# Patient Record
Sex: MALE | Race: BLACK OR AFRICAN AMERICAN | NOT HISPANIC OR LATINO | Employment: UNEMPLOYED | URBAN - METROPOLITAN AREA
[De-identification: names, ages, dates, MRNs, and addresses within clinical notes are randomized per-mention and may not be internally consistent; named-entity substitution may affect disease eponyms.]

---

## 2021-02-18 ENCOUNTER — HOSPITAL ENCOUNTER (EMERGENCY)
Facility: HOSPITAL | Age: 36
Discharge: HOME | End: 2021-02-19
Attending: EMERGENCY MEDICINE
Payer: COMMERCIAL

## 2021-02-18 VITALS
HEART RATE: 97 BPM | OXYGEN SATURATION: 99 % | DIASTOLIC BLOOD PRESSURE: 69 MMHG | TEMPERATURE: 98 F | SYSTOLIC BLOOD PRESSURE: 134 MMHG | RESPIRATION RATE: 16 BRPM

## 2021-02-18 DIAGNOSIS — F41.9 ANXIETY: Primary | ICD-10-CM

## 2021-02-18 DIAGNOSIS — Z76.89 ENCOUNTER FOR PSYCHIATRIC ASSESSMENT: ICD-10-CM

## 2021-02-18 PROCEDURE — 99283 EMERGENCY DEPT VISIT LOW MDM: CPT

## 2021-02-18 PROCEDURE — 99284 EMERGENCY DEPT VISIT MOD MDM: CPT | Performed by: PHYSICIAN ASSISTANT

## 2021-02-18 RX ORDER — HYDROXYZINE HYDROCHLORIDE 25 MG/1
25 TABLET, FILM COATED ORAL EVERY 6 HOURS
Qty: 12 TABLET | Refills: 0 | Status: SHIPPED | OUTPATIENT
Start: 2021-02-18

## 2021-02-18 RX ORDER — OLANZAPINE 5 MG/1
10 TABLET, ORALLY DISINTEGRATING ORAL ONCE
Status: COMPLETED | OUTPATIENT
Start: 2021-02-18 | End: 2021-02-18

## 2021-02-18 RX ADMIN — OLANZAPINE 10 MG: 5 TABLET, ORALLY DISINTEGRATING ORAL at 22:19
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
## 2021-02-19 NOTE — DISCHARGE INSTRUCTIONS
Please refer to the attached information for strict return instructions  If symptoms worsen or new symptoms develop please return to the ER  Please schedule follow up with psychiatry as soon as possible for re-evaluation  Use prescribed medication as needed for anxiety

## 2021-02-20 ENCOUNTER — HOSPITAL ENCOUNTER (EMERGENCY)
Facility: HOSPITAL | Age: 36
Discharge: HOME | End: 2021-02-21
Attending: EMERGENCY MEDICINE | Admitting: EMERGENCY MEDICINE
Payer: COMMERCIAL

## 2021-02-20 DIAGNOSIS — F19.10 DRUG ABUSE (HCC): Primary | ICD-10-CM

## 2021-02-20 LAB
AMPHETAMINES SERPL QL SCN: POSITIVE
BARBITURATES UR QL: NEGATIVE
BENZODIAZ UR QL: NEGATIVE
COCAINE UR QL: NEGATIVE
ETHANOL EXG-MCNC: 0 MG/DL
FLUAV RNA RESP QL NAA+PROBE: NEGATIVE
FLUBV RNA RESP QL NAA+PROBE: NEGATIVE
METHADONE UR QL: NEGATIVE
OPIATES UR QL SCN: NEGATIVE
OXYCODONE+OXYMORPHONE UR QL SCN: NEGATIVE
PCP UR QL: NEGATIVE
RSV RNA RESP QL NAA+PROBE: NEGATIVE
SARS-COV-2 RNA RESP QL NAA+PROBE: NEGATIVE
THC UR QL: NEGATIVE

## 2021-02-20 PROCEDURE — 99284 EMERGENCY DEPT VISIT MOD MDM: CPT | Performed by: EMERGENCY MEDICINE

## 2021-02-20 PROCEDURE — 80307 DRUG TEST PRSMV CHEM ANLYZR: CPT | Performed by: EMERGENCY MEDICINE

## 2021-02-20 PROCEDURE — 0241U HB NFCT DS VIR RESP RNA 4 TRGT: CPT | Performed by: EMERGENCY MEDICINE

## 2021-02-20 PROCEDURE — 99284 EMERGENCY DEPT VISIT MOD MDM: CPT

## 2021-02-20 PROCEDURE — 82075 ASSAY OF BREATH ETHANOL: CPT | Performed by: EMERGENCY MEDICINE

## 2021-02-20 RX ORDER — MIRTAZAPINE 30 MG/1
30 TABLET, FILM COATED ORAL
COMMUNITY

## 2021-02-20 RX ORDER — OLANZAPINE 5 MG/1
10 TABLET, ORALLY DISINTEGRATING ORAL ONCE
Status: COMPLETED | OUTPATIENT
Start: 2021-02-20 | End: 2021-02-20

## 2021-02-20 RX ADMIN — OLANZAPINE 10 MG: 5 TABLET, ORALLY DISINTEGRATING ORAL at 19:16

## 2021-02-21 VITALS
OXYGEN SATURATION: 99 % | DIASTOLIC BLOOD PRESSURE: 80 MMHG | TEMPERATURE: 97.5 F | SYSTOLIC BLOOD PRESSURE: 125 MMHG | WEIGHT: 143.74 LBS | RESPIRATION RATE: 16 BRPM | HEART RATE: 68 BPM

## 2021-02-21 NOTE — ED NOTES
Chief Complaint   Patient presents with    Anxiety     Pt states that he has been having increased anxiety and feeling that "somebody follwed me into the hospital "     Pt  Is a 39 y/o Male who presented to ED with Anxiety and Paranoia  Pt  Denies SI/HI at this time and claims to only have came to ED because he felt safe here  Pt  Thinks that he may be hearing voices and seeing things and people who are not really there  Pt  Is claims to currently be homeless  Pt  Was UDS came up Positive for Amph/Meth to which pt  Claimed that he used Meth lasted week and is currently using K2  when asked if pt  Wanted to get detox or rehab he said yes  Crisis intake Completed  Safety Risk assessment completed       Host currently beinging notified

## 2021-02-21 NOTE — ED NOTES
Belongings removed from room and placed in locker  Pt changed into paper scrubs       Reyes Castaneda RN  02/20/21 0074

## 2021-02-21 NOTE — ED NOTES
The rehab facility that pt is accepted to is 3 hrs away and is too far for a lyft transport to be approved  Leticia Montoya from [facility] has confirmation of facility address and states that transportation is available and a  from the facility will be picking him up this evening   ED address and phone number relayed so transportation can call when they arrive     Lorenza Sicard  02/21/21 [address]  02/21/21 5234

## 2021-02-21 NOTE — ED NOTES
Patient spoke with Rao Bustamante from host he states she will be bed searching him and will call us back        Felicia Ball RN  02/21/21 5511

## 2021-02-21 NOTE — ED NOTES
RN notified CW that Host attempted to talk to PT but Pt was falling alseep so they will call back in a hour

## 2021-02-21 NOTE — ED NOTES
Patient belongings placed in reserve locker  Patient given a turkey sandwich and orange juice at this time       Bambi Sexton  02/20/21 1946

## 2021-02-21 NOTE — ED NOTES
Rima FERRERA now states they cannot find transportation for patient and we would be required to find transport       Emiliano Rodarte RN  02/21/21 2200

## 2021-02-21 NOTE — ED NOTES
Spoke with Gina Ramsey from [facility], who states she will call back in about an hour to complete assessment  All required information faxed at this time
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
## 2021-02-21 NOTE — ED NOTES
Rachel Hernandez with host called back, states she got the patient a bed at [facility] however patient needs to call and do the pre-screening questions  I called for patient and gave him the phone to do the questions at this time  Rachel Hernandez will then reach out to [facility] to complete the process        Dolly Hodgkins, RN  02/21/21 1621

## 2021-02-21 NOTE — ED NOTES
Patient in bed sleeping, respirations equal and non-labored   Spoke with Zarina (crisis worker) and made her aware of HOST eval       Karin Marcum RN  02/21/21 8631

## 2021-02-21 NOTE — CASE MANAGEMENT
Received notification from Chante, hospital supervisor, that patient is requiring transportation to drug rehab at [facility]  Initially [employer] [facility] was going to transport, but last minute, they stated they couldn't find transportation  Called [facility] to arrange Lyft ride  [place] is approximately 3 hours from hospital   accepted the ride and estimated cost is between $173-$867  Discussed with  Clinical Coordinator, Erika Austin  Agreed that hospital will pay for transportation to avoid unnecessary delayed discharge  Updated [place] Ramon coordinator, that I have approval for transportation cost  Marianne Hedrick confirmed  will be at hospital in 15 minutes  Notified Chante of transport time and she stated she would call ER to make sure they are aware and have patient ready to leave  No other d/c needs identified

## 2021-02-21 NOTE — ED NOTES
Host spoke with pt and once again stated that pt would not stay awake for eval  Host worker stating they will try again in the morning  Provider made aware        Larry Mari RN  02/20/21 5590

## 2021-02-21 NOTE — ED CARE HANDOFF
Emergency Department Sign Out Note        Sign out and transfer of care from Dr Kevin Obrien  See Separate Emergency Department note  The patient, Harriet Gomes, was evaluated by the previous provider for drug usage, patient requesting detox       Workup Completed:  Labs Reviewed   RAPID DRUG SCREEN, URINE - Abnormal       Result Value Ref Range Status    Amph/Meth UR Positive (*) Negative Final    Barbiturate Ur Negative  Negative Final    Benzodiazepine Urine Negative  Negative Final    Cocaine Urine Negative  Negative Final    Methadone Urine Negative  Negative Final    Opiate Urine Negative  Negative Final    PCP Ur Negative  Negative Final    THC Urine Negative  Negative Final    Oxycodone Urine Negative  Negative Final    Narrative:     FOR MEDICAL PURPOSES ONLY  IF CONFIRMATION NEEDED PLEASE CONTACT THE LAB WITHIN 5 DAYS  Drug Screen Cutoff Levels:  AMPHETAMINE/METHAMPHETAMINES  1000 ng/mL  BARBITURATES     200 ng/mL  BENZODIAZEPINES     200 ng/mL  COCAINE      300 ng/mL  METHADONE      300 ng/mL  OPIATES      300 ng/mL  PHENCYCLIDINE     25 ng/mL  THC       50 ng/mL  OXYCODONE      100 ng/mL   COVID19, INFLUENZA A/B, RSV PCR, [facility] - Normal    SARS-CoV-2 Negative  Negative Final    Comment:      INFLUENZA A PCR Negative  Negative Final    Comment:      INFLUENZA B PCR Negative  Negative Final    Comment:      RSV PCR Negative  Negative Final    Comment:      Narrative: This test has been authorized by FDA under an EUA (Emergency Use Assay) for use by authorized laboratories  Clinical caution and judgement should be used with the interpretation of these results with consideration of the clinical impression and other laboratory testing  Testing reported as "Positive" or "Negative" has been proven to be accurate according to standard laboratory validation requirements  All testing is performed with control materials showing appropriate reactivity at standard intervals     POCT ALCOHOL BREATH TEST - Normal    EXTBreath Alcohol 0 00   Final         ED Course / Workup Pending (followup): Pt was evaluated by HOST, patient was reportedly falling asleep during the encounter, HOST indicated they would call back in the morning of February 21st for further assessment      1000: Informed patient spoke with HOST, has been accepted at [facility], transfer logistics currently being worked on     97 [phone] [id number]:  Transportation arranged by HOST arrived, patient discharged to go directly to [facility]                        ED Course as of Feb 21 1557   Michelle Tinajeroley Feb 21, 2021   1554 Informed by nursing staff that after the patient left the department, his transportation ride had left the hospital without the patient  Rima FERRERA was contacted, they indicated that they wanted [facility] to arrange transportation for the patient to go to [facility]  Pt signed out to Dr Jessica Galeana who will addend this dictation only if the patient can't be transported to rehab facility        Procedures  MDM    Disposition  Final diagnoses:   Drug abuse [facility])     Time reflects when diagnosis was documented in both MDM as applicable and the Disposition within this note     Time User Action Codes Description Comment    2/21/2021 10:03 AM Adrienne Mclain Add [F19 10] Drug abuse [facility])       ED Disposition     None      Follow-up Information     Follow up With Specialties Details Why Contact Info    Go directly to [facility]            Patient's Medications   Discharge Prescriptions    No medications on file     No discharge procedures on file         ED Provider  Electronically Signed by     Julio Winter, DO  02/21/21 [id number], DO  02/21/21 1559

## 2021-02-21 NOTE — ED NOTES
Pt spoke with host via telephone, host worker stated that pt is falling asleep, not answering questions  Will call back in an hour to re-evaluate  Upon entering room pt is arousable to verbal stimuli        Rosa Elena Castillo RN  02/20/21 2053

## 2021-02-21 NOTE — ED NOTES
Joslyn arriving for patient in approx 5-10 mins per hospital supervisor       Isabela Aldana RN  02/21/21 2518

## 2021-02-21 NOTE — ED NOTES
Patient completed pre-screening questions with [facility], awaiting call back from HOST at this time for transport time  Dr Rosanna Nix aware       Massimo Arroyo RN  02/21/21 8410

## 2021-04-26 ENCOUNTER — TELEPHONE (OUTPATIENT)
Dept: PSYCHIATRY | Facility: CLINIC | Age: 36
End: 2021-04-26

## 2023-10-20 ENCOUNTER — HOSPITAL ENCOUNTER (EMERGENCY)
Facility: HOSPITAL | Age: 38
Discharge: HOME | End: 2023-10-20
Attending: EMERGENCY MEDICINE
Payer: MEDICARE

## 2023-10-20 VITALS
WEIGHT: 147.2 LBS | RESPIRATION RATE: 20 BRPM | HEART RATE: 61 BPM | DIASTOLIC BLOOD PRESSURE: 62 MMHG | TEMPERATURE: 98.2 F | OXYGEN SATURATION: 100 % | SYSTOLIC BLOOD PRESSURE: 111 MMHG

## 2023-10-20 DIAGNOSIS — F31.9 BIPOLAR DISORDER (HCC): ICD-10-CM

## 2023-10-20 DIAGNOSIS — F14.10 COCAINE ABUSE (HCC): Primary | ICD-10-CM

## 2023-10-20 LAB
AMPHETAMINES SERPL QL SCN: NEGATIVE
BARBITURATES UR QL: NEGATIVE
BENZODIAZ UR QL: NEGATIVE
COCAINE UR QL: POSITIVE
ETHANOL EXG-MCNC: 0 MG/DL
METHADONE UR QL: NEGATIVE
OPIATES UR QL SCN: NEGATIVE
OXYCODONE+OXYMORPHONE UR QL SCN: NEGATIVE
PCP UR QL: NEGATIVE
THC UR QL: NEGATIVE

## 2023-10-20 PROCEDURE — 82075 ASSAY OF BREATH ETHANOL: CPT

## 2023-10-20 PROCEDURE — 80307 DRUG TEST PRSMV CHEM ANLYZR: CPT

## 2023-10-20 PROCEDURE — 99282 EMERGENCY DEPT VISIT SF MDM: CPT

## 2023-10-20 PROCEDURE — 99285 EMERGENCY DEPT VISIT HI MDM: CPT

## 2023-10-20 RX ORDER — OLANZAPINE 10 MG/1
10 TABLET ORAL
Qty: 30 TABLET | Refills: 0 | Status: SHIPPED | OUTPATIENT
Start: 2023-10-20

## 2023-10-20 RX ORDER — FLUOXETINE HYDROCHLORIDE 20 MG/1
40 CAPSULE ORAL DAILY
Status: DISCONTINUED | OUTPATIENT
Start: 2023-10-20 | End: 2023-10-20 | Stop reason: HOSPADM

## 2023-10-20 RX ORDER — OLANZAPINE 10 MG/1
10 TABLET ORAL
Qty: 30 TABLET | Refills: 0 | Status: SHIPPED | OUTPATIENT
Start: 2023-10-20 | End: 2023-10-20 | Stop reason: SDUPTHER

## 2023-10-20 RX ORDER — FLUOXETINE HYDROCHLORIDE 40 MG/1
40 CAPSULE ORAL DAILY
Qty: 30 CAPSULE | Refills: 0 | Status: SHIPPED | OUTPATIENT
Start: 2023-10-20 | End: 2023-10-20 | Stop reason: SDUPTHER

## 2023-10-20 RX ORDER — FLUOXETINE HYDROCHLORIDE 40 MG/1
40 CAPSULE ORAL DAILY
Qty: 30 CAPSULE | Refills: 0 | Status: SHIPPED | OUTPATIENT
Start: 2023-10-20

## 2023-10-20 RX ORDER — OLANZAPINE 10 MG/1
10 TABLET ORAL ONCE
Status: COMPLETED | OUTPATIENT
Start: 2023-10-20 | End: 2023-10-20

## 2023-10-20 RX ADMIN — FLUOXETINE 40 MG: 20 CAPSULE ORAL at 07:50

## 2023-10-20 RX ADMIN — OLANZAPINE 10 MG: 10 TABLET, FILM COATED ORAL at 07:50

## 2023-10-20 NOTE — ED CARE HANDOFF
[facility] Warm Handoff Outcome Note    Patient name Umer Rivera  Location ED 07/ED 07 MRN [id number]  Age: [address] y.o. Plan Type:   Warm Handoff                                                                                    Plan Date: 10/20/2023  Service:  ED Warm Handoff      Substance Use History:  Cocaine    Warm Handoff Update:  Pt accepted IP bed at [facility]    Warm Handoff Outcome: Residential  Inpatient

## 2023-10-20 NOTE — CERTIFIED RECOVERY SPECIALIST
Certified  Note    Patient name: Anny Friday  Location: ED 07/ED [address]: [address] [place] [address]  Attending:  Arnie Tobar MRN [id number]  : 1985  Age: 43 y.o. Sex: male Date 10/20/2023         Substance Use History:     Social History     Substance and Sexual Activity   Alcohol Use Not Currently        Social History     Substance and Sexual Activity   Drug Use Yes    Types: Other, "Crack" cocaine    Comment: synthetic marijuana       Admission Information  Substances Used at This Admission[de-identified] Cocaine  Readmission in Last 30 Days?: No  Encounter Type[de-identified] Patient Face-to-Face    Recovery Support Plan  Declined All Services?: No  Medication Assisted Treatment[de-identified] No  Agreeable to Warm Handoff?: Yes  Is Patient Accepting [facility] Services?: Yes  Was Referral Made to [address] [place] [address]?: No  Was Narcan Provided at Discharge?: No  Plan Discussed With Treatment Team[de-identified] Yes  Plan Discussed With[de-identified] Provider, Nurse    Referral to Recovery Supports:  [facility] [address][de-identified] No  Community Based CRS[de-identified] No  Case Management[de-identified] No  Direct Access to [facility]?: No  Resource Guide Given?: Yes  Follow Up With Patient[de-identified] Yes (Ongoing)  Family / Other Support[de-identified] No  Referral for Community Physical Health[de-identified] No  Referral for Community Mental Health[de-identified] No'    CRS received consult to meet with patient. CRS provided introductions and explanation of service. Patient falling asleep during conversation but did share intention of return visit. Patient acknowledged and agreed. CRS updated nurse and provider.   CRS shared concern for possible fentanyl ingestion/overdose       Nasim Bowie

## 2023-10-20 NOTE — ED CARE HANDOFF
Emergency Department Sign Out Note        Sign out and transfer of care from Dr. Aurelia Miller. See Separate Emergency Department note. The patient, Emelia Garza, was evaluated by the previous provider for detox. Workup Completed:  Medical care for detox admission    ED Course / Workup Pending (followup):  Prescribed patient's psychiatric medications, placed in rehab/detox. Procedures  Medical Decision Making  Amount and/or Complexity of Data Reviewed  Labs: ordered. Risk  Prescription drug management. Disposition  Final diagnoses:   Cocaine abuse ([address])   Bipolar disorder ([address])     Time reflects when diagnosis was documented in both MDM as applicable and the Disposition within this note       Time User Action Codes Description Comment    10/20/2023  6:14 AM Nora Iqbal [F14.10] Cocaine abuse ([address])     10/20/2023  6:32 AM Nora Iqbal [F31.9] Bipolar disorder [facility])           ED Disposition       ED Disposition   Discharge    Condition   Stable    Date/Time   Fri Oct 20, 2023 11:06 AM    Comment   Emelia Garza discharge to home/self care. Follow-up Information    None       Current Discharge Medication List        START taking these medications    Details   FLUoxetine (PROzac) 40 MG capsule Take 1 capsule (40 mg total) by mouth daily  Qty: 30 capsule, Refills: 0    Associated Diagnoses: Cocaine abuse (HCC)      OLANZapine (ZyPREXA) 10 mg tablet Take 1 tablet (10 mg total) by mouth daily at bedtime  Qty: 30 tablet, Refills: 0    Associated Diagnoses: Cocaine abuse ([address])           CONTINUE these medications which have NOT CHANGED    Details   hydrOXYzine HCL (ATARAX) 25 mg tablet Take 1 tablet (25 mg total) by mouth every 6 (six) hours  Qty: 12 tablet, Refills: 0    Associated Diagnoses: Anxiety      mirtazapine (REMERON) 30 mg tablet Take 30 mg by mouth daily at bedtime           No discharge procedures on file.        ED Provider  Electronically Signed by     Larissa Licona MD  10/20/23 0574

## 2023-10-20 NOTE — ED NOTES
Pt agreeable to go to [facility]. Host aware of same.      Eric Conway RN  10/20/23 0436

## 2023-10-20 NOTE — ED PROVIDER NOTES
History  Chief Complaint   Patient presents with    Detox Evaluation     Requesting detox from crack-cocaine - using for about five months; last use a few hours ago - uses about 3.5 grams a day - denies alcohol or other drug use      The patient is a 43 y.o. male with a past medical history of bipolar disorder and polysubstance abuse, who presents for a detox evaluation. He reports smoking 3.5g of crack cocaine daily for the last five months. Denies associated methamphetamine, THC, opioid, or alcohol use. The patient has been in detox/rehab for polysubstance abuse several times before, the most recent time being a few months ago. He also reports that he is seeing shadows, but is unsure if this is because he has not slept in 3 days or because he has not taken his psychiatric medications (Fluoxetine and Lamotrigine) in the last 2-3 months. Denies SI, HI, or auditory hallucinations. Patient does state he would like to be restarted on his psychiatric medications. No physical complaints at this time. Prior to Admission Medications   Prescriptions Last Dose Informant Patient Reported? Taking?   hydrOXYzine HCL (ATARAX) 25 mg tablet   No No   Sig: Take 1 tablet (25 mg total) by mouth every 6 (six) hours   Patient not taking: Reported on 2/20/2021   mirtazapine (REMERON) 30 mg tablet   Yes No   Sig: Take 30 mg by mouth daily at bedtime      Facility-Administered Medications: None       Past Medical History:   Diagnosis Date    Anxiety        History reviewed. No pertinent surgical history. History reviewed. No pertinent family history. I have reviewed and agree with the history as documented. E-Cigarette/Vaping     E-Cigarette/Vaping Substances     Social History     Tobacco Use    Smoking status: Every Day    Smokeless tobacco: Never   Substance Use Topics    Alcohol use: Not Currently    Drug use: Yes     Types:  Other, "Crack" cocaine     Comment: synthetic marijuana       Review of Systems Constitutional:  Negative for chills, diaphoresis and fever. HENT:  Negative for ear pain and sore throat. Eyes:  Negative for pain and visual disturbance. Respiratory:  Negative for cough and shortness of breath. Cardiovascular:  Negative for chest pain and palpitations. Gastrointestinal:  Negative for abdominal pain, nausea and vomiting. Genitourinary:  Negative for dysuria and hematuria. Musculoskeletal:  Negative for arthralgias, back pain and myalgias. Skin:  Negative for color change and rash. Neurological:  Negative for seizures, syncope and headaches. Psychiatric/Behavioral:  Positive for hallucinations and sleep disturbance. Negative for suicidal ideas. All other systems reviewed and are negative. Physical Exam  Physical Exam  Vitals and nursing note reviewed. Constitutional:       General: He is awake. He is not in acute distress. Appearance: Normal appearance. He is well-developed and normal weight. He is not toxic-appearing. HENT:      Head: Normocephalic and atraumatic. Right Ear: External ear normal.      Left Ear: External ear normal.      Nose: Nose normal.      Mouth/Throat:      Lips: Pink. Mouth: Mucous membranes are moist.   Eyes:      General: Lids are normal. Vision grossly intact. Gaze aligned appropriately. Conjunctiva/sclera: Conjunctivae normal.      Pupils: Pupils are equal, round, and reactive to light. Cardiovascular:      Rate and Rhythm: Normal rate and regular rhythm. Pulmonary:      Effort: Pulmonary effort is normal. No respiratory distress. Musculoskeletal:      Cervical back: Normal, full passive range of motion without pain and neck supple. Thoracic back: Normal.      Lumbar back: Normal.      Comments: Patient moves all extremities without difficulty and ambulates with a steady gait. Skin:     General: Skin is warm. Capillary Refill: Capillary refill takes less than 2 seconds.       Coloration: Skin is not jaundiced or pale. Findings: No abrasion, signs of injury, lesion, rash or wound. Neurological:      Mental Status: He is oriented to person, place, and time and easily aroused. GCS: GCS eye subscore is 4. GCS verbal subscore is 5. GCS motor subscore is 6. Comments: Patient is drowsy, but is easily arousable and answers questions appropriately. Psychiatric:         Attention and Perception: Attention normal. He perceives visual hallucinations. Mood and Affect: Mood normal. Affect is flat. Speech: Speech normal. Speech is not rapid and pressured, slurred or tangential.         Behavior: Behavior is cooperative. Thought Content: Thought content normal. Thought content is not paranoid. Thought content does not include homicidal or suicidal ideation. Vital Signs  ED Triage Vitals [10/20/23 0558]   Temperature Pulse Respirations Blood Pressure SpO2   98.2 °F (36.8 °C) 78 20 137/95 100 %      Temp Source Heart Rate Source Patient Position - Orthostatic VS BP Location FiO2 (%)   Oral Monitor Lying Left arm --      Pain Score       --           Vitals:    10/20/23 0558   BP: 137/95   Pulse: 78   Patient Position - Orthostatic VS: Lying       ED Medications  Medications   OLANZapine (ZyPREXA) tablet 10 mg (has no administration in time range)   FLUoxetine (PROzac) capsule 40 mg (has no administration in time range)       Diagnostic Studies  Results Reviewed       Procedure Component Value Units Date/Time    POCT alcohol breath test [[id number]]  (Normal) Resulted: 10/20/23 0629    Lab Status: Final result Updated: 10/20/23 0629     EXTBreath Alcohol 0.00    Rapid drug screen, urine [[id number]] Collected: 10/20/23 0621    Lab Status:  In process Specimen: Urine, Clean Catch Updated: 10/20/23 9462                   No orders to display              Procedures  Procedures         ED Course           SBIRT (Z71.41, Z71.51):  Screening: I have reviewed and agree with the nursing documentation below. Brief Intervention: gave feedback about screening results, impairment, and risks while clarifying the findings, informed the patient about safe consumption limits and offered advice about change, assessed the patient's readiness to change, and negotiated goals and strategies for change  Referral to Treatment: In Process-- Referrals placed for HOST and the certified   Time spent with patient for SBIRT: 15 minutes         SBIRT 22yo+      Flowsheet Row Most Recent Value   Initial Alcohol Screen: US AUDIT-C     1. How often do you have a drink containing alcohol? 0 Filed at: 10/20/2023 0558   2. How many drinks containing alcohol do you have on a typical day you are drinking? 0 Filed at: 10/20/2023 0558   3a. Male UNDER 65: How often do you have five or more drinks on one occasion? 0 Filed at: 10/20/2023 0558   Audit-C Score 0 Filed at: 10/20/2023 3374   SHELLI: How many times in the past year have you. .. Used an illegal drug or used a prescription medication for non-medical reasons? Daily or Almost Daily Filed at: 10/20/2023 4252                Medical Decision Making  Patient with a history of polysubstance use and bipolar disorder presents requesting detox from crack cocaine. He also reports visual hallucinations and not taking his psychiatric medications for several months. He is interested in restarting his psychiatric medications, but is mainly interested in detox. POCT breath alcohol was negative. UDS pending. Reached out to Gianna who also recommended the patient be evaluated by crisis. Patient signed out to Dr. Betsy Joseph for final disposition pending HOST and crisis evaluations. Problems Addressed:  Bipolar disorder [facility]): acute illness or injury  Cocaine abuse [facility]): acute illness or injury    Amount and/or Complexity of Data Reviewed  External Data Reviewed: notes.      Details: Reviewed multiple encounters for [facility] regarding substance use and mental health treatment  Labs: ordered. Disposition  Final diagnoses:   Cocaine abuse ([address])   Bipolar disorder ([address])     Time reflects when diagnosis was documented in both MDM as applicable and the Disposition within this note       Time User Action Codes Description Comment    10/20/2023  6:14 AM Nora Iqbal Add [F14.10] Cocaine abuse ([address])     10/20/2023  6:32 AM Nora Iqbal Add [F31.9] Bipolar disorder [facility])           ED Disposition       None          Follow-up Information    None         Patient's Medications   Discharge Prescriptions    No medications on file       No discharge procedures on file.     PDMP Review       None            ED Provider  Electronically Signed by             Jorge Mock PA-C  10/20/23 0558

## 2023-10-20 NOTE — ED NOTES
Patient is resting comfortably.  No resp distress noted, blanket given to pt for comfort     Juan Alberto Garcia RN  10/20/23 [address], RN  10/20/23 9214

## 2023-10-20 NOTE — CERTIFIED RECOVERY SPECIALIST
Certified  Note    Patient name: Kianna Jane  Location: ED 07/ED [address]: [address] [place] [address]  Attending:  Mary Prince, * MRN [id number]  : 1985  Age: 43 y.o. Sex: male Date 10/20/2023         Substance Use History:     Social History     Substance and Sexual Activity   Alcohol Use Not Currently        Social History     Substance and Sexual Activity   Drug Use Yes    Types: Other, "Crack" cocaine    Comment: synthetic marijuana       Admission Information  Substances Used at This Admission[de-identified] Cocaine  Readmission in Last 30 Days?: No  Encounter Type[de-identified] Patient Face-to-Face    Recovery Support Plan  Declined All Services?: No  Medication Assisted Treatment[de-identified] No  Agreeable to Warm Handoff?: Yes  Is Patient Accepting [facility] Services?: Yes  Was Referral Made to [address] [place] [address]?: No  Was Narcan Provided at Discharge?: No  Plan Discussed With Treatment Team[de-identified] Yes  Plan Discussed With[de-identified] Provider, Nurse    Referral to Recovery Supports:  [facility] [address][de-identified] No  Community Based CRS[de-identified] No  Case Management[de-identified] No  Direct Access to [facility]?: No  Resource Guide Given?: Yes  Follow Up With Patient[de-identified] Yes (Ongoing)  Family / Other Support[de-identified] No  Referral for Community Physical Health[de-identified] No  Referral for Community Mental Health[de-identified] No'    Conversation with provider regarding restart of psychiatric medications.   Email sent to HOST for bed placement      Radha Godfrey

## 2023-10-20 NOTE — CERTIFIED RECOVERY SPECIALIST
Certified  Note    Patient name: Emelia Garza  Location: ED 07/ED [address]: [address] [place] [address]  Attending:  Papa Ochoa, * MRN [id number]  : 1985  Age: 43 y.o. Sex: male Date 10/20/2023         Substance Use History:     Social History     Substance and Sexual Activity   Alcohol Use Not Currently        Social History     Substance and Sexual Activity   Drug Use Yes    Types: Other, "Crack" cocaine    Comment: synthetic marijuana       Admission Information  Substances Used at This Admission[de-identified] Cocaine  Readmission in Last 30 Days?: No  Encounter Type[de-identified] Patient Face-to-Face    Recovery Support Plan  Declined All Services?: No  Medication Assisted Treatment[de-identified] No  Agreeable to Warm Handoff?: Yes  Is Patient Accepting [facility] Services?: Yes  Was Referral Made to [address] [place] [address]?: No  Was Narcan Provided at Discharge?: No  Plan Discussed With Treatment Team[de-identified] Yes  Plan Discussed With[de-identified] Provider, Nurse    Referral to Recovery Supports:  [facility] [address][de-identified] No  Community Based CRS[de-identified] No  Case Management[de-identified] No  Direct Access to [facility]?: No  Resource Guide Given?: Yes  Follow Up With Patient[de-identified] Yes (Ongoing)  Family / Other Support[de-identified] No  Referral for Community Physical Health[de-identified] No  Referral for Community Mental Health[de-identified] No'    HOST contact:    [facility] search initiated. [facility]. Will update. Nurse, provider, and charge made aware. Charge attached to email with HOST.     Update:  Patient accepted at , will call ED to confirm and discuss       [address] [facility]

## 2024-01-29 ENCOUNTER — HOSPITAL ENCOUNTER (EMERGENCY)
Facility: HOSPITAL | Age: 39
Discharge: HOME | End: 2024-01-29
Attending: EMERGENCY MEDICINE
Payer: MEDICARE

## 2024-01-29 VITALS
TEMPERATURE: 99.2 F | RESPIRATION RATE: 20 BRPM | OXYGEN SATURATION: 98 % | HEART RATE: 87 BPM | DIASTOLIC BLOOD PRESSURE: 94 MMHG | SYSTOLIC BLOOD PRESSURE: 153 MMHG

## 2024-01-29 DIAGNOSIS — R23.8 SKIN BREAKDOWN: Primary | ICD-10-CM

## 2024-01-29 LAB
BILIRUB UR QL STRIP: NEGATIVE
CLARITY UR: CLEAR
COLOR UR: YELLOW
GLUCOSE UR STRIP-MCNC: NEGATIVE MG/DL
HGB UR QL STRIP.AUTO: NEGATIVE
KETONES UR STRIP-MCNC: NEGATIVE MG/DL
LEUKOCYTE ESTERASE UR QL STRIP: NEGATIVE
NITRITE UR QL STRIP: NEGATIVE
PH UR STRIP.AUTO: 6 [PH] (ref 4.5–8)
PROT UR STRIP-MCNC: NEGATIVE MG/DL
SP GR UR STRIP.AUTO: 1.02 (ref 1–1.03)
UROBILINOGEN UR QL STRIP.AUTO: 0.2 E.U./DL

## 2024-01-29 PROCEDURE — 81003 URINALYSIS AUTO W/O SCOPE: CPT

## 2024-01-29 PROCEDURE — 99283 EMERGENCY DEPT VISIT LOW MDM: CPT

## 2024-01-29 PROCEDURE — 87591 N.GONORRHOEAE DNA AMP PROB: CPT

## 2024-01-29 PROCEDURE — 99284 EMERGENCY DEPT VISIT MOD MDM: CPT | Performed by: EMERGENCY MEDICINE

## 2024-01-29 PROCEDURE — 87491 CHLMYD TRACH DNA AMP PROBE: CPT

## 2024-01-29 RX ORDER — NYSTATIN 100000 [USP'U]/G
POWDER TOPICAL 2 TIMES DAILY
Status: DISCONTINUED | OUTPATIENT
Start: 2024-01-29 | End: 2024-01-29 | Stop reason: HOSPADM

## 2024-01-29 RX ADMIN — NYSTATIN: 100000 POWDER TOPICAL at 19:19

## 2024-01-29 NOTE — ED ATTENDING ATTESTATION
"Final Diagnoses:     1. Skin breakdown      ED Course as of 01/29/24 2046 Mon Jan 29, 2024 2045 Leukocytes, UA: Negative   2046 Nitrite, UA: Negative       I, Mike Jean-Baptiste MD, saw and evaluated the patient. All available labs and X-rays were ordered by me or the resident / non-physician and have been reviewed by myself. I discussed the patient with the resident / non-physician and agree with the resident's / non-physician practitioner's findings and plan as documented in the resident's / non-physician practicitioner's note, except where noted.   At this point, I agree with the current assessment done in the ED.   I was present during key portions of all procedures performed unless otherwise stated.     HPI:  NURSING TRIAGE:    This is a 38 y.o. male presenting for evaluation of multiple complaints.   Coming from [facility] for cocaine  He has \"hiker's foot\" for a week or so  Nothing changed today except mildly burning with touching it.   Hx of similar. Powder helped it in the past?  No f/ch/n/v/cp/sob.  No injection to the site.     Also, 2 bumps in the groin noted, going on for a day maybe.  No penile discharge  No discharge  No testicular pain.   Chief Complaint   Patient presents with    Foot Pain     Pt coming from [facility] c/o bilateral foot pain and say's its \"Hiker's Feet.\"    Abscess     Pt also c/o 2 \"lumps\" to L groin      PHYSICAL: ASSESSMENT + PLAN:   Pertinent: Trench foot between the toes.  Moist macerated tissue.   No cellulitis.     General: VS reviewed  Appears in NAD  awake, alert.   Well-nourished, well-developed. Appears stated age.   Speaking normally in full sentences.   Head: Normocephalic, atraumatic  Eyes: EOM-I. No diplopia.   No hyphema.   No subconjunctival hemorrhages.  Symmetrical lids.   ENT: Atraumatic external nose and ears.    MMM  No malocclusion. No stridor. Normal phonation. No drooling. Normal swallowing.   Neck: No JVD.  CV: No pallor noted  Lungs:   No " "tachypnea  No respiratory distress  Abd: soft nt nd no rebound/guarding  MSK:   FROM spontaneously  Skin: Dry, intact.   Neuro: Awake, alert, GCS15, CN II-XII grossly intact.   Motor grossly intact.  Psychiatric/Behavioral: interacting normally; appropriate mood/affect.    Exam: deferred    Vitals:    01/29/24 1420 01/29/24 1421   BP: 153/94    BP Location: Left arm    Pulse: 87    Resp: 20    Temp:  99.2 °F (37.3 °C)   TempSrc: Temporal Temporal   SpO2: 98%     Urine for infection, GC given RFs  Treat as trench foot  F/u podiatry  Nystatin powder, keep dry.      There are no obvious limitations to social determinants of care.   Nursing note reviewed.   Vitals reviewed.   Orders placed by myself and/or advanced practitioner / resident.    Previous chart was reviewed  No language barrier.   History obtained from patient.    There are no limitations to the history obtained:     Past Medical: Past Surgical:    has a past medical history of Anxiety.  has no past surgical history on file.   Social: Cardiac (Echo/Cath)   Social History     Substance and Sexual Activity   Alcohol Use Not Currently     Social History     Tobacco Use   Smoking Status Every Day   Smokeless Tobacco Never     Social History     Substance and Sexual Activity   Drug Use Yes    Types: Other, \"Crack\" cocaine    Comment: synthetic marijuana    No results found for this or any previous visit.    No results found for this or any previous visit.    No results found for this or any previous visit.     Labs: Imaging:   Labs Reviewed   CHLAMYDIA /GC AMPLIFIED DNA   POCT URINALYSIS DIPSTICK   URINE MACROSCOPIC, POC       Result Value Ref Range Status    Color, UA Yellow   Final    Clarity, UA Clear   Final    pH, UA 6.0  4.5 - 8.0 Final    Leukocytes, UA Negative  Negative Final    Nitrite, UA Negative  Negative Final    Protein, UA Negative  Negative mg/dl Final    Glucose, UA Negative  Negative mg/dl Final    Ketones, UA Negative  Negative mg/dl Final "    Urobilinogen, UA 0.2  0.2, 1.0 E.U./dl E.U./dl Final    Bilirubin, UA Negative  Negative Final    Occult Blood, UA Negative  Negative Final    Specific Gravity, UA 1.025  1.003 - 1.030 Final    Narrative:     CLINITEK RESULT    No orders to display      Medications: Code Status:   Medications   nystatin (MYCOSTATIN) powder ( Topical Given 1/29/24 1919)    Code Status: No Order  Advance Directive and Living Will:      Power of :    POLST:       Orders Placed This Encounter   Procedures    Chlamydia/GC amplified DNA by PCR    POCT urinalysis dipstick     Time reflects when diagnosis was documented in both MDM as applicable and the Disposition within this note       Time User Action Codes Description Comment    1/29/2024  7:47 PM Gigi Simon Add [R23.8] Skin breakdown     1/29/2024  7:48 PM Gigi Simon Modify [R23.8] Skin breakdown web space b/l feet          ED Disposition       ED Disposition   Discharge    Condition   Stable    Date/Time   Mon Jan 29, 2024  7:46 PM    Comment   Gray Landaverde discharge to home/self care.                   Follow-up Information       Follow up With Specialties Details Why Contact Info    [facility]  Call in 2 days  [phone]            Patient's Medications   Discharge Prescriptions    No medications on file     No discharge procedures on file.  Prior to Admission Medications   Prescriptions Last Dose Informant Patient Reported? Taking?   FLUoxetine (PROzac) 40 MG capsule   No No   Sig: Take 1 capsule (40 mg total) by mouth daily   OLANZapine (ZyPREXA) 10 mg tablet   No No   Sig: Take 1 tablet (10 mg total) by mouth daily at bedtime   hydrOXYzine HCL (ATARAX) 25 mg tablet   No No   Sig: Take 1 tablet (25 mg total) by mouth every 6 (six) hours   Patient not taking: Reported on 2/20/2021   mirtazapine (REMERON) 30 mg tablet   Yes No   Sig: Take 30 mg by mouth daily at bedtime      Facility-Administered Medications: None                        Portions of the record  "may have been created with voice recognition software. Occasional wrong word or \"sound a like\" substitutions may have occurred due to the inherent limitations of voice recognition software. Read the chart carefully and recognize, using context, where substitutions have occurred.    Electronically signed by:  Mike Jean-Baptiste    "

## 2024-01-30 LAB
C TRACH DNA SPEC QL NAA+PROBE: NEGATIVE
N GONORRHOEA DNA SPEC QL NAA+PROBE: NEGATIVE

## 2024-01-30 NOTE — ED PROVIDER NOTES
"History  Chief Complaint   Patient presents with    Foot Pain     Pt coming from [facility] c/o bilateral foot pain and say's its \"Hiker's Feet.\"    Abscess     Pt also c/o 2 \"lumps\" to L groin     Patient is a 38-year-old male presenting from [facility] for evaluation of bilateral foot pain and pruritus.  States that he has \"hikers foot\" and that he has had it before.  States that when he gets out of the shower he does not have time to properly dry his feet and states that the area in between his toes is slightly painful and itchy with skin breakdown.  He is [place] way to run for rehab for crack cocaine use.  States that he also has 2 \"lumps\" in his groin that just appeared today denies any IVDA.  Denies fever chills environmental exposure urinary complaints penile pain penile discharge or any other complaints.          Prior to Admission Medications   Prescriptions Last Dose Informant Patient Reported? Taking?   FLUoxetine (PROzac) 40 MG capsule   No No   Sig: Take 1 capsule (40 mg total) by mouth daily   OLANZapine (ZyPREXA) 10 mg tablet   No No   Sig: Take 1 tablet (10 mg total) by mouth daily at bedtime   hydrOXYzine HCL (ATARAX) 25 mg tablet   No No   Sig: Take 1 tablet (25 mg total) by mouth every 6 (six) hours   Patient not taking: Reported on 2/20/2021   mirtazapine (REMERON) 30 mg tablet   Yes No   Sig: Take 30 mg by mouth daily at bedtime      Facility-Administered Medications: None       Past Medical History:   Diagnosis Date    Anxiety        History reviewed. No pertinent surgical history.    History reviewed. No pertinent family history.  I have reviewed and agree with the history as documented.    E-Cigarette/Vaping     E-Cigarette/Vaping Substances     Social History     Tobacco Use    Smoking status: Every Day    Smokeless tobacco: Never   Substance Use Topics    Alcohol use: Not Currently    Drug use: Yes     Types: Other, \"Crack\" cocaine     Comment: synthetic marijuana        Review of " Systems   Constitutional:  Negative for chills and fever.   HENT:  Negative for congestion.    Respiratory:  Negative for cough and shortness of breath.    Cardiovascular:  Negative for chest pain.   Gastrointestinal:  Negative for abdominal pain, nausea and vomiting.   Genitourinary:  Negative for dysuria, penile discharge, penile pain, scrotal swelling and testicular pain.   Musculoskeletal:  Negative for back pain and neck pain.        Foot pain   Skin:  Negative for rash.   Allergic/Immunologic:        Groin lumps   Neurological:  Negative for weakness, numbness and headaches.   All other systems reviewed and are negative.      Physical Exam  ED Triage Vitals   Temperature Pulse Respirations Blood Pressure SpO2   01/29/24 1421 01/29/24 1420 01/29/24 1420 01/29/24 1420 01/29/24 1420   99.2 °F (37.3 °C) 87 20 153/94 98 %      Temp Source Heart Rate Source Patient Position - Orthostatic VS BP Location FiO2 (%)   01/29/24 1420 01/29/24 1420 01/29/24 1420 01/29/24 1420 --   Temporal Monitor Sitting Left arm       Pain Score       01/29/24 1420       7             Orthostatic Vital Signs  Vitals:    01/29/24 1420   BP: 153/94   Pulse: 87   Patient Position - Orthostatic VS: Sitting       Physical Exam  Vitals and nursing note reviewed. Exam conducted with a chaperone present.   Constitutional:       General: He is not in acute distress.     Appearance: He is well-developed.   HENT:      Head: Normocephalic and atraumatic.   Eyes:      Conjunctiva/sclera: Conjunctivae normal.   Cardiovascular:      Rate and Rhythm: Normal rate and regular rhythm.      Heart sounds: No murmur heard.  Pulmonary:      Effort: Pulmonary effort is normal. No respiratory distress.      Breath sounds: Normal breath sounds.   Abdominal:      Palpations: Abdomen is soft.      Tenderness: There is no abdominal tenderness.   Genitourinary:     Comments: Has inguinal lymphadenopathy nontender  Musculoskeletal:         General: No swelling. Normal  range of motion.      Cervical back: Neck supple.      Comments: Skin maceration in between toes does not appear overtly fungal   Skin:     General: Skin is warm and dry.      Capillary Refill: Capillary refill takes less than 2 seconds.   Neurological:      Mental Status: He is alert.   Psychiatric:         Mood and Affect: Mood normal.         ED Medications  Medications - No data to display      Diagnostic Studies  Results Reviewed       Procedure Component Value Units Date/Time    Chlamydia/GC amplified DNA by PCR [[id number]]  (Normal) Collected: 01/29/24 2004    Lab Status: Final result Specimen: Urine, Other Updated: 01/30/24 1524     N gonorrhoeae, DNA Probe Negative     Chlamydia trachomatis, DNA Probe Negative    Narrative:      This test was performed using the FDA-approved Fadi 6800 CT/NG assay (Roche [employer]). This test uses real-time PCR to detect Chlamydia trachomatis (CT) and Neisseria gonorrhoeae (NG). This instrument and assay have been validated by the  and performing laboratory and verified by the performing laboratory.  This test is intended as an aid in the diagnosis of chlamydial and gonococcal disease. This test has not been evaluated in patients younger than 14 years of age and is not recommended for evaluation of suspected sexual abuse. This assay is not intended to replace other exams or tests for diagnosis of urogenital infection by causative factors other than Chalmydia trachomatis (CT) and Neisseria gonorrhoeae (NG). Additional testing is recommended when the results do not correlate with clinical signs and symptoms.   Procedural Limitations  This assay has only been validated for use with male and female urine, clinician-instructed self-collected vaginal swab specimens, clinician-collected vaginal swab specimens, endocervical swab specimens collected in fadi® PCR Media and cervical specimens collected in PreservCyt® Solution. Assay performance has not been validated  for use with other collection media and/or specimen types.   Detection of C. trachomatis and N. gonorrhoeaea is dependent on the number of organisms present in the specimen. Detection may be affected by specimen collection methods, patient factors, stage of infection, infecting strains, and presence of polymerase/PCR inhibitors.   When CT is present at very high concentrations, the detection of NG present at concentrations near the limit of detection may be impacted.  The presence of mucus in endocervical specimens may lead to false negative results.  The presence of whole blood in urine and cervical specimens collected in PreservCyt Solution may lead to false negative and/or invalid test results.   Urine testing is recommended to be performed on first catch urine samples. The effects of other collection variables have not been evaluated at this time. The effects of vaginal discharge, tampon use, douching, and other collection variables have not been evaluated at this time.   This assay has not been evaluated with patients currently being treated with antimicrobial agents active against CT or NG, or patients with a history of hysterectomy.     Urine Macroscopic, POC [[id number]] Collected: 01/29/24 2022    Lab Status: Final result Specimen: Urine Updated: 01/29/24 2024     Color, UA Yellow     Clarity, UA Clear     pH, UA 6.0     Leukocytes, UA Negative     Nitrite, UA Negative     Protein, UA Negative mg/dl      Glucose, UA Negative mg/dl      Ketones, UA Negative mg/dl      Urobilinogen, UA 0.2 E.U./dl      Bilirubin, UA Negative     Occult Blood, UA Negative     Specific Gravity, UA 1.025    Narrative:      CLINITEK RESULT                   No orders to display         Procedures  Procedures      ED Course                                       Medical Decision Making  Patient is a 38-year-old male presenting for evaluation of foot pain and groin lumps    Patient symptoms likely secondary to having wet feet in  socks and boots does not appear fungal however could be a component of this.  Will try nystatin powder and wrapping the feet with gauze.  Recommend to patient that he completely dry his feet before he puts on socks and shoes.    Given the inguinal lymphadenopathy will order UA to make sure there is no UTI or GC chlamydia    Urine studies negative.  Patient feels improvement after powder application.  Hemodynamically stable cleared for discharge back to [facility].  Return precautions given patient verbalized understanding    Amount and/or Complexity of Data Reviewed  Labs: ordered.    Risk  Prescription drug management.          Disposition  Final diagnoses:   Skin breakdown - web space b/l feet     Time reflects when diagnosis was documented in both MDM as applicable and the Disposition within this note       Time User Action Codes Description Comment    1/29/2024  7:47 PM Gigi Simon Add [R23.8] Skin breakdown     1/29/2024  7:48 PM Gigi Simon Modify [R23.8] Skin breakdown web space b/l feet          ED Disposition       ED Disposition   Discharge    Condition   Stable    Date/Time   Mon Jan 29, 2024  7:46 PM    Comment   Gray Landaverde discharge to home/self care.                   Follow-up Information       Follow up With Specialties Details Why Contact Info    [facility]  Call in 2 days  [phone]              Discharge Medication List as of 1/29/2024  8:26 PM        CONTINUE these medications which have NOT CHANGED    Details   FLUoxetine (PROzac) 40 MG capsule Take 1 capsule (40 mg total) by mouth daily, Starting Fri 10/20/2023, Print      hydrOXYzine HCL (ATARAX) 25 mg tablet Take 1 tablet (25 mg total) by mouth every 6 (six) hours, Starting u 2/18/2021, Normal      mirtazapine (REMERON) 30 mg tablet Take 30 mg by mouth daily at bedtime, Historical Med      OLANZapine (ZyPREXA) 10 mg tablet Take 1 tablet (10 mg total) by mouth daily at bedtime, Starting Fri 10/20/2023, Print           No  discharge procedures on file.    PDMP Review       None             ED Provider  Attending physically available and evaluated Gray Densonubbs. I managed the patient along with the ED Attending.    Electronically Signed by           Gigi Simon DO  02/01/24 5234

## 2024-01-30 NOTE — DISCHARGE INSTRUCTIONS
You were seen and evaluated in the emergency department for skin breakdown of the feet and between the toes.  Key for healing is keeping the feet clean and dry.  Can use the powder as needed.  Please follow-up with your PCP or the doctor at the facility.  If your symptoms worsen or persist please return to the emergency department for further evaluation and management

## 2024-02-11 ENCOUNTER — HOSPITAL ENCOUNTER (EMERGENCY)
Facility: HOSPITAL | Age: 39
Discharge: HOME | End: 2024-02-11
Attending: EMERGENCY MEDICINE
Payer: MEDICARE

## 2024-02-11 VITALS
OXYGEN SATURATION: 94 % | WEIGHT: 202.82 LBS | TEMPERATURE: 98.4 F | HEART RATE: 114 BPM | RESPIRATION RATE: 16 BRPM | DIASTOLIC BLOOD PRESSURE: 69 MMHG | SYSTOLIC BLOOD PRESSURE: 135 MMHG

## 2024-02-11 DIAGNOSIS — F19.10 DRUG ABUSE (HCC): ICD-10-CM

## 2024-02-11 DIAGNOSIS — R41.82 ALTERED MENTAL STATUS: Primary | ICD-10-CM

## 2024-02-11 PROCEDURE — 99284 EMERGENCY DEPT VISIT MOD MDM: CPT

## 2024-02-11 PROCEDURE — 99283 EMERGENCY DEPT VISIT LOW MDM: CPT | Performed by: EMERGENCY MEDICINE

## 2024-02-11 NOTE — ED PROVIDER NOTES
"History  Chief Complaint   Patient presents with    Overdose - Accidental     [place]  laying on the ground like he was frozen  pt is responsive to voice and pain  giggling   believed to be K2 OD        38-year-old gentleman presents with suspected K2 abuse.  He was found lying on the ground by the bus terminal with a staring gaze and minimal interaction.  Patient was noted by EMS to have lateral nystagmus.  On arrival here the patient is laughing inappropriately but does not answer most questions.  No signs of trauma.      Altered Mental Status  Presenting symptoms: behavior changes, confusion and disorientation    Severity:  Unable to specify  Most recent episode:  Today  Episode history:  Single  Timing:  Constant  Progression:  Unchanged  Context: drug use    Associated symptoms: abnormal movement    Associated symptoms: no vomiting        Prior to Admission Medications   Prescriptions Last Dose Informant Patient Reported? Taking?   FLUoxetine (PROzac) 40 MG capsule   No No   Sig: Take 1 capsule (40 mg total) by mouth daily   OLANZapine (ZyPREXA) 10 mg tablet   No No   Sig: Take 1 tablet (10 mg total) by mouth daily at bedtime   hydrOXYzine HCL (ATARAX) 25 mg tablet   No No   Sig: Take 1 tablet (25 mg total) by mouth every 6 (six) hours   Patient not taking: Reported on 2/20/2021   mirtazapine (REMERON) 30 mg tablet   Yes No   Sig: Take 30 mg by mouth daily at bedtime      Facility-Administered Medications: None       Past Medical History:   Diagnosis Date    Anxiety        History reviewed. No pertinent surgical history.    History reviewed. No pertinent family history.  I have reviewed and agree with the history as documented.    E-Cigarette/Vaping     E-Cigarette/Vaping Substances     Social History     Tobacco Use    Smoking status: Every Day    Smokeless tobacco: Never   Substance Use Topics    Alcohol use: Not Currently    Drug use: Yes     Types: Other, \"Crack\" cocaine     " Comment: synthetic marijuana       Review of Systems   Unable to perform ROS: Mental status change   Gastrointestinal:  Negative for vomiting.   Psychiatric/Behavioral:  Positive for confusion.        Physical Exam  Physical Exam  Vitals and nursing note reviewed.   Constitutional:       General: He is not in acute distress.     Appearance: Normal appearance. He is well-developed. He is not ill-appearing, toxic-appearing or diaphoretic.   HENT:      Head: Normocephalic and atraumatic.      Right Ear: External ear normal.      Left Ear: External ear normal.      Nose: Nose normal.      Mouth/Throat:      Mouth: Mucous membranes are moist.      Pharynx: Oropharynx is clear.   Eyes:      Extraocular Movements:      Right eye: Nystagmus present.      Left eye: Nystagmus present.      Conjunctiva/sclera: Conjunctivae normal.      Pupils: Pupils are equal, round, and reactive to light.   Cardiovascular:      Rate and Rhythm: Normal rate and regular rhythm.      Heart sounds: Normal heart sounds.   Pulmonary:      Effort: Pulmonary effort is normal. No respiratory distress.      Breath sounds: Normal breath sounds.   Abdominal:      General: Bowel sounds are normal. There is no distension.      Palpations: Abdomen is soft.      Tenderness: There is no abdominal tenderness. There is no guarding.   Musculoskeletal:         General: Normal range of motion.      Cervical back: Neck supple. No rigidity.      Right lower leg: No edema.      Left lower leg: No edema.   Skin:     General: Skin is warm and dry.      Capillary Refill: Capillary refill takes less than 2 seconds.   Neurological:      Mental Status: He is alert.   Psychiatric:         Mood and Affect: Mood is elated. Affect is inappropriate.         Speech: Speech is delayed.         Behavior: Behavior is slowed.         Vital Signs  ED Triage Vitals   Temperature Pulse Respirations Blood Pressure SpO2   02/11/24 0203 02/11/24 0114 02/11/24 0114 02/11/24 0114 02/11/24  0114   98.5 °F (36.9 °C) (!) 125 16 150/77 92 %      Temp Source Heart Rate Source Patient Position - Orthostatic VS BP Location FiO2 (%)   02/11/24 0203 02/11/24 0114 02/11/24 0114 02/11/24 0114 --   Tympanic Monitor Lying Left arm       Pain Score       02/11/24 0114       No Pain           Vitals:    02/11/24 0114 02/11/24 0203   BP: 150/77 135/69   Pulse: (!) 125 (!) 114   Patient Position - Orthostatic VS: Lying Sitting         Visual Acuity      ED Medications  Medications - No data to display    Diagnostic Studies  Results Reviewed       None                   No orders to display              Procedures  Procedures         ED Course  ED Course as of 02/11/24 0205   Sun Feb 11, 2024 0200 Patient is now awake, alert, and oriented.  He is requesting discharge.                               SBIRT 20yo+      Flowsheet Row Most Recent Value   Initial Alcohol Screen: US AUDIT-C     2. How many drinks containing alcohol do you have on a typical day you are drinking?  0 Filed at: 02/11/2024 0118   Audit-C Score 0 Filed at: 02/11/2024 0118                      Medical Decision Making  38-year-old gentleman presents with suspected drug overdose.  The patient was found in a semiresponsive state lying on the sidewalk.  He was more responsive and somewhat interactive on arrival to the ED.  He was monitored and became fully awake, alert, and oriented.  He admitted to use of drugs and was requesting discharge.  Patient was deemed stable for discharge and was advised against the use of illicit substances.             Disposition  Final diagnoses:   Altered mental status   Drug abuse (HCC)     Time reflects when diagnosis was documented in both MDM as applicable and the Disposition within this note       Time User Action Codes Description Comment    2/11/2024  2:00 AM Noé Haro Add [R41.82] Altered mental status     2/11/2024  2:00 AM Noé Haro Add [F19.10] Drug abuse (HCC)           ED Disposition       ED  Disposition   Discharge    Condition   Stable    Date/Time   Sun Feb 11, 2024 0200    Comment   Gray Densonubbs discharge to home/self care.                   Follow-up Information    None         Current Discharge Medication List        CONTINUE these medications which have NOT CHANGED    Details   FLUoxetine (PROzac) 40 MG capsule Take 1 capsule (40 mg total) by mouth daily  Qty: 30 capsule, Refills: 0    Associated Diagnoses: Cocaine abuse (HCC)      hydrOXYzine HCL (ATARAX) 25 mg tablet Take 1 tablet (25 mg total) by mouth every 6 (six) hours  Qty: 12 tablet, Refills: 0    Associated Diagnoses: Anxiety      mirtazapine (REMERON) 30 mg tablet Take 30 mg by mouth daily at bedtime      OLANZapine (ZyPREXA) 10 mg tablet Take 1 tablet (10 mg total) by mouth daily at bedtime  Qty: 30 tablet, Refills: 0    Associated Diagnoses: Cocaine abuse (HCC)             No discharge procedures on file.    PDMP Review       None            ED Provider  Electronically Signed by             Noé Haro DO  02/11/24 0208

## 2024-02-26 ENCOUNTER — HOSPITAL ENCOUNTER (EMERGENCY)
Facility: HOSPITAL | Age: 39
Discharge: HOME | End: 2024-02-26
Attending: EMERGENCY MEDICINE | Admitting: EMERGENCY MEDICINE
Payer: MEDICARE

## 2024-02-26 VITALS
OXYGEN SATURATION: 98 % | TEMPERATURE: 98.2 F | DIASTOLIC BLOOD PRESSURE: 75 MMHG | SYSTOLIC BLOOD PRESSURE: 129 MMHG | HEART RATE: 82 BPM | RESPIRATION RATE: 16 BRPM | WEIGHT: 182.1 LBS

## 2024-02-26 DIAGNOSIS — F14.90 CRACK COCAINE USE: Primary | ICD-10-CM

## 2024-02-26 LAB
AMPHETAMINES SERPL QL SCN: NEGATIVE
BARBITURATES UR QL: NEGATIVE
BENZODIAZ UR QL: NEGATIVE
COCAINE UR QL: POSITIVE
ETHANOL EXG-MCNC: 0 MG/DL
METHADONE UR QL: NEGATIVE
OPIATES UR QL SCN: NEGATIVE
OXYCODONE+OXYMORPHONE UR QL SCN: NEGATIVE
PCP UR QL: NEGATIVE
THC UR QL: POSITIVE

## 2024-02-26 PROCEDURE — 99285 EMERGENCY DEPT VISIT HI MDM: CPT | Performed by: EMERGENCY MEDICINE

## 2024-02-26 PROCEDURE — 82075 ASSAY OF BREATH ETHANOL: CPT | Performed by: EMERGENCY MEDICINE

## 2024-02-26 PROCEDURE — 80307 DRUG TEST PRSMV CHEM ANLYZR: CPT | Performed by: EMERGENCY MEDICINE

## 2024-02-26 PROCEDURE — 99282 EMERGENCY DEPT VISIT SF MDM: CPT

## 2024-02-26 RX ORDER — OLANZAPINE 10 MG/1
10 TABLET ORAL
Status: DISCONTINUED | OUTPATIENT
Start: 2024-02-26 | End: 2024-02-26 | Stop reason: HOSPADM

## 2024-02-26 NOTE — ED PROVIDER NOTES
History  Chief Complaint   Patient presents with    Psychiatric Evaluation     Pt reports having SI with no specific plan.  States he is using crack cocaine daily and is living on the streets.  Denies HI/AH/VH.      HPI    39 yo M hx of polysubstance abuse, homelessness, anxiety presents to ed for psych eval.    SI HI: si no hi  Plan: no  Any particular triggers?: homelessness he states  Hallucinations:  did not endorse   Guns at home:  no   drugs:  yes, cocaine  Alcohol: denies any tonight   previous hospitalizations: yes   previous suicide attempt:    What psych meds does patient take: zyprexa  Any changes to those meds: no  Taking psych meds regularly: no    No other complaints on ros.      What drugs? Crack cocaine  How often? daily  Last time patient used? 2 hours ago  What method (i.e IV, smoke, etc.)? smoke  Alcohol use? Denies    States he did do Pyramid already two weeks ago  Wants to go back to rehab.            Prior to Admission Medications   Prescriptions Last Dose Informant Patient Reported? Taking?   FLUoxetine (PROzac) 40 MG capsule   No No   Sig: Take 1 capsule (40 mg total) by mouth daily   OLANZapine (ZyPREXA) 10 mg tablet   No No   Sig: Take 1 tablet (10 mg total) by mouth daily at bedtime   hydrOXYzine HCL (ATARAX) 25 mg tablet   No No   Sig: Take 1 tablet (25 mg total) by mouth every 6 (six) hours   Patient not taking: Reported on 2/20/2021   mirtazapine (REMERON) 30 mg tablet   Yes No   Sig: Take 30 mg by mouth daily at bedtime      Facility-Administered Medications: None       Past Medical History:   Diagnosis Date    Anxiety        History reviewed. No pertinent surgical history.    History reviewed. No pertinent family history.  I have reviewed and agree with the history as documented.    E-Cigarette/Vaping     E-Cigarette/Vaping Substances    Nicotine No     THC No     CBD No     Flavoring No     Other No     Unknown No      Social History     Tobacco Use    Smoking status: Every Day     "Smokeless tobacco: Never   Substance Use Topics    Alcohol use: Not Currently    Drug use: Yes     Types: Other, \"Crack\" cocaine     Comment: synthetic marijuana       Review of Systems   Constitutional:  Negative for chills, fatigue and fever.   HENT:  Negative for nosebleeds and sore throat.    Eyes:  Negative for redness and visual disturbance.   Respiratory:  Negative for shortness of breath and wheezing.    Cardiovascular:  Negative for chest pain and palpitations.   Gastrointestinal:  Negative for abdominal pain and diarrhea.   Endocrine: Negative for polyuria.   Genitourinary:  Negative for difficulty urinating and testicular pain.   Musculoskeletal:  Negative for back pain and neck stiffness.   Skin:  Negative for rash and wound.   Neurological:  Negative for seizures, speech difficulty and headaches.   Psychiatric/Behavioral:  Negative for dysphoric mood and hallucinations.    All other systems reviewed and are negative.      Physical Exam  Physical Exam  Vitals and nursing note reviewed.   Constitutional:       Appearance: He is well-developed.   HENT:      Head: Normocephalic and atraumatic.      Right Ear: External ear normal.      Left Ear: External ear normal.   Eyes:      Conjunctiva/sclera: Conjunctivae normal.   Cardiovascular:      Rate and Rhythm: Normal rate and regular rhythm.      Heart sounds: Normal heart sounds.   Pulmonary:      Effort: Pulmonary effort is normal.      Breath sounds: Normal breath sounds.   Abdominal:      General: There is no distension.      Tenderness: There is no guarding.   Musculoskeletal:         General: Normal range of motion.      Cervical back: Normal range of motion.   Skin:     General: Skin is warm and dry.      Findings: No rash.   Neurological:      Mental Status: He is alert and oriented to person, place, and time.      Cranial Nerves: No cranial nerve deficit.      Sensory: No sensory deficit.      Motor: No abnormal muscle tone.      Coordination: " Coordination normal.         Vital Signs  ED Triage Vitals [02/26/24 0234]   Temperature Pulse Respirations Blood Pressure SpO2   98.2 °F (36.8 °C) 93 18 (!) 197/103 97 %      Temp Source Heart Rate Source Patient Position - Orthostatic VS BP Location FiO2 (%)   Tympanic Monitor Sitting Left arm --      Pain Score       No Pain           Vitals:    02/26/24 0234 02/26/24 0505   BP: (!) 197/103 132/67   Pulse: 93 91   Patient Position - Orthostatic VS: Sitting          Visual Acuity      ED Medications  Medications   OLANZapine (ZyPREXA) tablet 10 mg (has no administration in time range)       Diagnostic Studies  Results Reviewed       Procedure Component Value Units Date/Time    Rapid drug screen, urine [[id number]]  (Abnormal) Collected: 02/26/24 0459    Lab Status: Final result Specimen: Urine, Clean Catch Updated: 02/26/24 0517     Amph/Meth UR Negative     Barbiturate Ur Negative     Benzodiazepine Urine Negative     Cocaine Urine Positive     Methadone Urine Negative     Opiate Urine Negative     PCP Ur Negative     THC Urine Positive     Oxycodone Urine Negative    Narrative:      Presumptive report. If requested, specimen will be sent to reference lab for confirmation.  FOR MEDICAL PURPOSES ONLY.   IF CONFIRMATION NEEDED PLEASE CONTACT THE LAB WITHIN 5 DAYS.    Drug Screen Cutoff Levels:  AMPHETAMINE/METHAMPHETAMINES  1000 ng/mL  BARBITURATES     200 ng/mL  BENZODIAZEPINES     200 ng/mL  COCAINE      300 ng/mL  METHADONE      300 ng/mL  OPIATES      300 ng/mL  PHENCYCLIDINE     25 ng/mL  THC       50 ng/mL  OXYCODONE      100 ng/mL    POCT alcohol breath test [[id number]]  (Normal) Resulted: 02/26/24 0314    Lab Status: Final result Updated: 02/26/24 0314     EXTBreath Alcohol 0.000                   No orders to display              Procedures  Procedures         ED Course                                             Medical Decision Making  Amount and/or Complexity of Data Reviewed  Labs:  ordered.    Risk  Prescription drug management.  Decision regarding hospitalization.      Reviewed past medical records: yes, known prior psych history    History Provided by patient     Differential considered: Decompensation in setting of known prior psych hx including med non compliance and/or drug induced psychosis.     Consideration of tests: Alcohol testing, UDS for signs of altered mental state in setting of drug or alcohol abuse, clearance testing for Crisis eval.     Behavioral Health checks: virtual, can contract verbally to safety.    Crisis evaluated, patient at this time wishes to go to rehab. HOST referral placed.     Signed out pending placement.            Disposition  Final diagnoses:   Crack cocaine use     Time reflects when diagnosis was documented in both MDM as applicable and the Disposition within this note       Time User Action Codes Description Comment    2/26/2024  3:40 AM Axel Navarro Add [F14.90] Crack cocaine use           ED Disposition       ED Disposition   Transfer to Behavioral Health Condition   --    Date/Time   Mon Feb 26, 2024  3:40 AM    Comment   Gray Landaverde should be transferred out to A and has been medically cleared.               Follow-up Information    None         Patient's Medications   Discharge Prescriptions    No medications on file       No discharge procedures on file.    PDMP Review       None            ED Provider  Electronically Signed by             Axel Navarro MD  02/26/24 0603

## 2024-02-26 NOTE — ED CARE HANDOFF
Emergency Department Sign Out Note        Sign out and transfer of care from Dr Navarro. See Separate Emergency Department note.     The patient, Gray Landaverde, was evaluated by the previous provider for crack abuse.    Workup Completed:  As above    ED Course / Workup Pending (followup):  HOST referral                                  ED Course as of 02/26/24 0705   Mon Feb 26, 2024   0658 Host eval crack abuse     Procedures  Medical Decision Making  Amount and/or Complexity of Data Reviewed  Labs: ordered.    Risk  Prescription drug management.  Decision regarding hospitalization.            Disposition  Final diagnoses:   Crack cocaine use     Time reflects when diagnosis was documented in both MDM as applicable and the Disposition within this note       Time User Action Codes Description Comment    2/26/2024  3:40 AM Axel Navarro Add [F14.90] Crack cocaine use           ED Disposition       ED Disposition   Transfer to Behavioral Health Condition   --    Date/Time   Mon Feb 26, 2024  3:40 AM    Comment   Gray Landaverde should be transferred out to A and has been medically cleared.               Follow-up Information    None       Patient's Medications   Discharge Prescriptions    No medications on file     No discharge procedures on file.       ED Provider  Electronically Signed by     Jasper Cintron MD  02/26/24 0706

## 2024-02-26 NOTE — CERTIFIED RECOVERY SPECIALIST
"   Certified  Note    Patient name: Gray Landaverde  Location: Z1H3/Z1H3  [place]: [facility]  Attending:  Jasper Cintron MD MRN [id number]  : 1985  Age: 38 y.o.    Sex: male Date 2024         Substance Use History:     Social History     Substance and Sexual Activity   Alcohol Use Not Currently        Social History     Substance and Sexual Activity   Drug Use Yes    Types: Other, \"Crack\" cocaine    Comment: synthetic marijuana     Patient sleeping, will check back         Heydi Eugene       "

## 2024-02-26 NOTE — ED CARE HANDOFF
[facility] Warm Handoff Outcome Note    Patient name Gray Landaverde  Location Z1H3/Z1H3 MRN [id number]  Age: 38 y.o.          Plan Type:  Warm Handoff                                                                                    Plan Date: 2/26/2024  Service:  ED Warm Handoff      Substance Use History:  Crack    Warm Handoff Update:  Pt accepted bed at [facility]    Warm Handoff Outcome: Residential  Inpatient

## 2024-02-29 ENCOUNTER — HOSPITAL ENCOUNTER (EMERGENCY)
Facility: HOSPITAL | Age: 39
Discharge: HOME | End: 2024-02-29
Attending: EMERGENCY MEDICINE
Payer: MEDICARE

## 2024-02-29 VITALS
TEMPERATURE: 99.2 F | RESPIRATION RATE: 20 BRPM | HEART RATE: 88 BPM | SYSTOLIC BLOOD PRESSURE: 146 MMHG | OXYGEN SATURATION: 97 % | DIASTOLIC BLOOD PRESSURE: 82 MMHG

## 2024-02-29 DIAGNOSIS — Z00.8 MEDICAL CLEARANCE FOR INCARCERATION: Primary | ICD-10-CM

## 2024-02-29 PROCEDURE — 99284 EMERGENCY DEPT VISIT MOD MDM: CPT | Performed by: EMERGENCY MEDICINE

## 2024-02-29 PROCEDURE — 99282 EMERGENCY DEPT VISIT SF MDM: CPT

## 2024-02-29 RX ORDER — MIRTAZAPINE 15 MG/1
30 TABLET, FILM COATED ORAL
Status: DISCONTINUED | OUTPATIENT
Start: 2024-02-29 | End: 2024-02-29 | Stop reason: HOSPADM

## 2024-02-29 RX ORDER — DROPERIDOL 2.5 MG/ML
5 INJECTION, SOLUTION INTRAMUSCULAR; INTRAVENOUS ONCE
Status: COMPLETED | OUTPATIENT
Start: 2024-02-29 | End: 2024-02-29

## 2024-02-29 RX ORDER — MIDAZOLAM HYDROCHLORIDE 1 MG/ML
3 INJECTION INTRAMUSCULAR; INTRAVENOUS ONCE
Status: COMPLETED | OUTPATIENT
Start: 2024-02-29 | End: 2024-02-29

## 2024-02-29 RX ORDER — OLANZAPINE 10 MG/1
10 TABLET ORAL ONCE
Status: COMPLETED | OUTPATIENT
Start: 2024-02-29 | End: 2024-02-29

## 2024-02-29 RX ADMIN — OLANZAPINE 10 MG: 10 TABLET, FILM COATED ORAL at 11:43

## 2024-02-29 NOTE — ED PROVIDER NOTES
"History  Chief Complaint   Patient presents with    Medical Problem     Pt arrived unclothed to ER in handcuffs with [employer] and EMS on stretcher. Per EMS pt was throwing himself around in group home cell. Pt needs to be cleared for incarceration. Per EMS they lyla medications to sedate pt but they were not needed due de-escalation. Pt has abrasions on b/l shoulders. Pt denies SI/VH/AH. Pt reports HI. When asked if he has HI pt stated \"I got to keep that to myself can't premeditate.\"      HPI  Patient is a 39-year-old male presenting for medical clearance.  Per [employer] patient has been showing signs of aggression ever since being handcuffed.  Patient had to be physically restrained multiple times.  Reportedly patient prior to arrival was complaining about some wrist pain.  Patient denies any SI/HI and also reports no visual or auditory hallucinations.  Despite the earlier complaints the patient was showing signs of aggression patient was calm and cooperative upon arrival.    Prior to Admission Medications   Prescriptions Last Dose Informant Patient Reported? Taking?   FLUoxetine (PROzac) 40 MG capsule   No No   Sig: Take 1 capsule (40 mg total) by mouth daily   OLANZapine (ZyPREXA) 10 mg tablet   No No   Sig: Take 1 tablet (10 mg total) by mouth daily at bedtime   hydrOXYzine HCL (ATARAX) 25 mg tablet   No No   Sig: Take 1 tablet (25 mg total) by mouth every 6 (six) hours   Patient not taking: Reported on 2/20/2021   mirtazapine (REMERON) 30 mg tablet   Yes No   Sig: Take 30 mg by mouth daily at bedtime      Facility-Administered Medications: None       Past Medical History:   Diagnosis Date    Anxiety        History reviewed. No pertinent surgical history.    History reviewed. No pertinent family history.  I have reviewed and agree with the history as documented.    E-Cigarette/Vaping     E-Cigarette/Vaping Substances    Nicotine No     THC No     CBD No     Flavoring No     Other No     Unknown No      Social History " "    Tobacco Use    Smoking status: Every Day    Smokeless tobacco: Never   Substance Use Topics    Alcohol use: Not Currently    Drug use: Yes     Types: Other, \"Crack\" cocaine     Comment: synthetic marijuana       Review of Systems   Constitutional:  Negative for chills, diaphoresis, fever and unexpected weight change.   HENT:  Negative for ear pain and sore throat.    Eyes:  Negative for visual disturbance.   Respiratory:  Negative for cough, chest tightness and shortness of breath.    Cardiovascular:  Negative for chest pain and leg swelling.   Gastrointestinal:  Negative for abdominal distention, abdominal pain, constipation, diarrhea, nausea and vomiting.   Endocrine: Negative.    Genitourinary:  Negative for difficulty urinating and dysuria.   Musculoskeletal: Negative.    Skin: Negative.    Allergic/Immunologic: Negative.    Neurological: Negative.    Hematological: Negative.    Psychiatric/Behavioral: Negative.     All other systems reviewed and are negative.      Physical Exam  Physical Exam  Vitals and nursing note reviewed.   Constitutional:       General: He is not in acute distress.     Appearance: Normal appearance. He is not ill-appearing.   HENT:      Head: Normocephalic and atraumatic.      Right Ear: External ear normal.      Left Ear: External ear normal.      Nose: Nose normal.      Mouth/Throat:      Mouth: Mucous membranes are moist.      Pharynx: Oropharynx is clear.   Eyes:      General: No scleral icterus.        Right eye: No discharge.         Left eye: No discharge.      Extraocular Movements: Extraocular movements intact.      Conjunctiva/sclera: Conjunctivae normal.      Pupils: Pupils are equal, round, and reactive to light.   Cardiovascular:      Rate and Rhythm: Normal rate and regular rhythm.      Pulses: Normal pulses.      Heart sounds: Normal heart sounds.   Pulmonary:      Effort: Pulmonary effort is normal.      Breath sounds: Normal breath sounds.   Abdominal:      General: " Abdomen is flat. Bowel sounds are normal. There is no distension.      Palpations: Abdomen is soft.      Tenderness: There is no abdominal tenderness. There is no guarding or rebound.   Musculoskeletal:         General: Normal range of motion.      Cervical back: Normal range of motion and neck supple.   Skin:     General: Skin is warm and dry.      Capillary Refill: Capillary refill takes less than 2 seconds.   Neurological:      General: No focal deficit present.      Mental Status: He is alert and oriented to person, place, and time. Mental status is at baseline.   Psychiatric:         Mood and Affect: Mood normal.         Behavior: Behavior normal.         Thought Content: Thought content normal.         Judgment: Judgment normal.         Vital Signs  ED Triage Vitals   Temperature Pulse Respirations Blood Pressure SpO2   02/29/24 1121 02/29/24 1122 02/29/24 1122 02/29/24 1122 02/29/24 1122   99.2 °F (37.3 °C) 88 20 146/82 97 %      Temp Source Heart Rate Source Patient Position - Orthostatic VS BP Location FiO2 (%)   02/29/24 1121 02/29/24 1122 02/29/24 1122 02/29/24 1122 --   Tympanic Monitor Sitting Left arm       Pain Score       --                  Vitals:    02/29/24 1122   BP: 146/82   Pulse: 88   Patient Position - Orthostatic VS: Sitting         Visual Acuity      ED Medications  Medications   mirtazapine (REMERON) tablet 30 mg (has no administration in time range)   midazolam (FOR EMS ONLY) (VERSED) 2 mg/2 mL injection 6 mg (0 mg Does not apply Given to EMS 2/29/24 1125)   droperidol (INAPSINE) injection 5 mg (0 mg Intramuscular Given to EMS 2/29/24 1125)   OLANZapine (ZyPREXA) tablet 10 mg (10 mg Oral Given 2/29/24 1143)       Diagnostic Studies  Results Reviewed       None                   No orders to display              Procedures  Procedures         ED Course                               SBIRT 20yo+      Flowsheet Row Most Recent Value   Initial Alcohol Screen: US AUDIT-C     1. How often do  "you have a drink containing alcohol? 0 Filed at: 02/29/2024 1135   2. How many drinks containing alcohol do you have on a typical day you are drinking?  0 Filed at: 02/29/2024 1135   3a. Male UNDER 65: How often do you have five or more drinks on one occasion? 0 Filed at: 02/29/2024 1135   3b. FEMALE Any Age, or MALE 65+: How often do you have 4 or more drinks on one occassion? 0 Filed at: 02/29/2024 1135   Audit-C Score 0 Filed at: 02/29/2024 1135   SHELLI: How many times in the past year have you...    Used an illegal drug or used a prescription medication for non-medical reasons? Daily or Almost Daily Filed at: 02/29/2024 1135   DAST-10: In the past 12 months...    1. Have you used drugs other than those required for medical reasons? 1 Filed at: 02/29/2024 1135   2. Do you use more than one drug at a time? 1 Filed at: 02/29/2024 1135   3. Have you had medical problems as a result of your drug use (e.g., memory loss, hepatitis, convulsions, bleeding, etc.)? 1 Filed at: 02/29/2024 1135   4. Have you had \"blackouts\" or \"flashbacks\" as a result of drug use?YesNo 1 Filed at: 02/29/2024 1135   5. Do you ever feel bad or guilty about your drug use? --  [Pt unable to answer questions. Pt keeps changing subject.] Filed at: 02/29/2024 1135                      Medical Decision Making  39-year-old male presenting with aggression and need for medical clearance prior to incarceration.  Patient with a history of bipolar disorder and states that he has not been taking his meds for the past 2 weeks.  Offered his home meds and patient agreed to take them  Patient is calm and cooperative with no signs of aggression upon arrival  Patient denies any SI/HI and also reports no visual or auditory hallucinations does not meet criteria for inpatient psych  Patient is stable for discharge and medically cleared for incarceration.    Problems Addressed:  Medical clearance for incarceration: acute illness or injury    Risk  Prescription drug " management.             Disposition  Final diagnoses:   Medical clearance for incarceration     Time reflects when diagnosis was documented in both MDM as applicable and the Disposition within this note       Time User Action Codes Description Comment    2/29/2024 12:53 PM Kevin Deutsch Add [Z00.8] Medical clearance for incarceration           ED Disposition       ED Disposition   Discharge    Condition   Stable    Date/Time   Thu Feb 29, 2024 1253    Comment   Gray Landaverde discharge to home/self care.                   Follow-up Information       Follow up With Specialties Details Why Contact Info Additional Information    [facility] Emergency Department Emergency Medicine Go to  If symptoms worsen [address] [facility] [id number] [phone] [facility] Emergency Department            Discharge Medication List as of 2/29/2024 12:53 PM        CONTINUE these medications which have NOT CHANGED    Details   FLUoxetine (PROzac) 40 MG capsule Take 1 capsule (40 mg total) by mouth daily, Starting Fri 10/20/2023, Print      hydrOXYzine HCL (ATARAX) 25 mg tablet Take 1 tablet (25 mg total) by mouth every 6 (six) hours, Starting Thu 2/18/2021, Normal      mirtazapine (REMERON) 30 mg tablet Take 30 mg by mouth daily at bedtime, Historical Med      OLANZapine (ZyPREXA) 10 mg tablet Take 1 tablet (10 mg total) by mouth daily at bedtime, Starting Fri 10/20/2023, Print             No discharge procedures on file.    PDMP Review       None            ED Provider  Electronically Signed by             Kevin Deutsch MD  02/29/24 8285

## 2024-02-29 NOTE — ED NOTES
"As nurse was triaging pt he stated he had rib pain and b/l arm pain. Pt was not answering direct questions. Pt upset and kept making threatening remarks to arresting [employer] officer. [employer] officer remains at bedside. Pt then negated his statement about having pain. Pt reports he has not complaints. Pt said \"I am hungry as hell and thirsty.\" This nurse brought pt lemon lime soda with ice. Pt also brought sandwich and snacks. After getting settled into room pt stopped yelling. Pt is now sitting up at side of bed eating. Pt is stating he has PTSD and that's why reacted that way. Pt has remorse for prior actions from arrest. [employer] officer remains at bedside. Pt within view of nurse's station.      Kanwal Collazo RN  02/29/24 3645    "

## 2024-05-10 ENCOUNTER — HOSPITAL ENCOUNTER (EMERGENCY)
Facility: HOSPITAL | Age: 39
Discharge: HOME | End: 2024-05-10
Attending: EMERGENCY MEDICINE
Payer: MEDICARE

## 2024-05-10 VITALS
SYSTOLIC BLOOD PRESSURE: 129 MMHG | HEART RATE: 66 BPM | WEIGHT: 193.34 LBS | RESPIRATION RATE: 16 BRPM | OXYGEN SATURATION: 97 % | TEMPERATURE: 98.2 F | DIASTOLIC BLOOD PRESSURE: 72 MMHG

## 2024-05-10 DIAGNOSIS — F19.10 DRUG ABUSE (HCC): Primary | ICD-10-CM

## 2024-05-10 LAB
AMPHETAMINES SERPL QL SCN: NEGATIVE
BARBITURATES UR QL: NEGATIVE
BENZODIAZ UR QL: NEGATIVE
COCAINE UR QL: POSITIVE
ETHANOL EXG-MCNC: 0 MG/DL
FENTANYL UR QL SCN: NEGATIVE
HYDROCODONE UR QL SCN: NEGATIVE
METHADONE UR QL: NEGATIVE
OPIATES UR QL SCN: NEGATIVE
OXYCODONE+OXYMORPHONE UR QL SCN: NEGATIVE
PCP UR QL: NEGATIVE
THC UR QL: POSITIVE

## 2024-05-10 PROCEDURE — 82075 ASSAY OF BREATH ETHANOL: CPT | Performed by: EMERGENCY MEDICINE

## 2024-05-10 PROCEDURE — 99285 EMERGENCY DEPT VISIT HI MDM: CPT | Performed by: EMERGENCY MEDICINE

## 2024-05-10 PROCEDURE — 80307 DRUG TEST PRSMV CHEM ANLYZR: CPT | Performed by: EMERGENCY MEDICINE

## 2024-05-10 PROCEDURE — 99284 EMERGENCY DEPT VISIT MOD MDM: CPT

## 2024-05-10 RX ORDER — LAMOTRIGINE 25 MG/1
15 TABLET ORAL DAILY
COMMUNITY

## 2024-05-10 NOTE — CERTIFIED RECOVERY SPECIALIST
"   Certified  Note    Patient name: Gray Landaverde  Location: ED 10/ED 10  [place]: [facility]  Attending:  Yael Gross, * MRN [id number]  : 1985  Age: 39 y.o.    Sex: male Date 5/10/2024         Substance Use History:     Social History     Substance and Sexual Activity   Alcohol Use Not Currently        Social History     Substance and Sexual Activity   Drug Use Yes    Types: Other, \"Crack\" cocaine    Comment: synthetic marijuana         Admission Information  Substances Used at This Admission:: Cocaine, THC, Other (please comment) (Fentanyl occasionaly)  Encounter Type:: Family/Support Phone    Recovery Support Plan  Declined All Services?: No  Medication Assisted Treatment:: No  Agreeable to Warm Handoff?: Yes  Is Patient Accepting [facility] Services?: Yes  Was Referral Made to [facility]?: Yes  [facility]:: [facility]  Was Narcan Provided at Discharge?: No  Plan Discussed With Treatment Team:: Yes  Plan Discussed With:: Nurse    Referral to Recovery Supports:  Recovery Center:: Yes  Community Based CRS:: No  Case Management:: No  Direct Access to [facility]?: Yes  Resource Guide Given?: Yes  Follow Up With Patient:: Yes (until discharge)  Family / Other Support:: No  Referral for Community Physical Health:: No  Referral for Community Mental Health:: No'    CRS provided introductions and explanation of service. CRS and patient engaged in conversation of hospitalization. Patient discussed needs he feels would be appropriate. CRS shared understanding.     Patient interested in rehab and going into IP treatment. Patient was in recovery, had recurrence of use and would like to get treatment at this time     [facility] been [facility] to [facility] and will be transported today at 430      Will continue to follow until dc    Resources provided         Heydi Eugene       "

## 2024-05-10 NOTE — ED NOTES
Patient noted to be sleeping with easy non labored respirations. No apparent discomfort or distress noted.     Kae Ingram RN  05/10/24 2415

## 2024-05-10 NOTE — ED CARE HANDOFF
[facility] Warm Handoff Outcome Note    Patient name Gray Landaverde  Location ED 10/ED 10 MRN [id number]  Age: 39 y.o.          Plan Type:  Warm Handoff                                                                                    Plan Date: 5/10/2024  Service:  ED Warm Handoff      Substance Use History:  Cocaine    Warm Handoff Update:  Pt accepted IP bed at [facility]    Warm Handoff Outcome: Residential  Inpatient

## 2024-05-10 NOTE — ED NOTES
Patient decided he wanted to leave and did not want to wait for [facility] team to pick him up at 1630. Patient also declined leaving with his belongings,patient signed belongings sheet stating that he did not want his belongings, this nurse and Thomas from Security was a witness to this. Pt proceeded to leave the emergency room at this time.       This nurse called guero and talked to Agnes and told her patient did not want to come to the facility and that he was leaving the emergency room.      Nalini Castillo RN  05/10/24 0276

## 2024-05-10 NOTE — ED NOTES
Assumed care of pt from previous shift. Pt sleeping at this time with no issues     Raghavendra Villanueva RN  05/10/24 8860

## 2024-05-10 NOTE — ED PROVIDER NOTES
"History  Chief Complaint   Patient presents with    Detox Evaluation     \"I want to detox\" from crack cocaine and fentanyl.  Uses crack daily and fentanyl \"every so often\".       39-year-old gentleman presents requesting detox.  He reports using crack on a daily basis.  He will drink alcohol occasionally and will also use fentanyl on occasion.  Denies any acute medical issues or concerns.  He has been to detox on several other occasions.  He reports that he was in rehab starting 2 months ago and was clean for a few weeks.      Detox Evaluation  Similar prior episodes: yes    Severity:  Moderate  Onset quality:  Gradual  Duration: Weeks.  Timing:  Constant  Progression:  Worsening  Chronicity:  Recurrent  Suspected agents:  Crack (Fentanyl)  Associated symptoms: blackouts        Prior to Admission Medications   Prescriptions Last Dose Informant Patient Reported? Taking?   FLUoxetine (PROzac) 40 MG capsule Not Taking  No No   Sig: Take 1 capsule (40 mg total) by mouth daily   Patient not taking: Reported on 5/10/2024   OLANZapine (ZyPREXA) 10 mg tablet  Self No No   Sig: Take 1 tablet (10 mg total) by mouth daily at bedtime   Patient taking differently: Take 20 mg by mouth daily at bedtime   hydrOXYzine HCL (ATARAX) 25 mg tablet   No No   Sig: Take 1 tablet (25 mg total) by mouth every 6 (six) hours   Patient not taking: Reported on 2/20/2021   lamoTRIgine (LaMICtal) 25 mg tablet  Self Yes Yes   Sig: Take 15 mg by mouth daily   mirtazapine (REMERON) 30 mg tablet Not Taking  Yes No   Sig: Take 30 mg by mouth daily at bedtime   Patient not taking: Reported on 5/10/2024      Facility-Administered Medications: None       Past Medical History:   Diagnosis Date    Anxiety        History reviewed. No pertinent surgical history.    History reviewed. No pertinent family history.  I have reviewed and agree with the history as documented.    E-Cigarette/Vaping     E-Cigarette/Vaping Substances    Nicotine No     THC No     CBD " "No     Flavoring No     Other No     Unknown No      Social History     Tobacco Use    Smoking status: Every Day    Smokeless tobacco: Never   Substance Use Topics    Alcohol use: Not Currently    Drug use: Yes     Types: Other, \"Crack\" cocaine     Comment: synthetic marijuana       Review of Systems   All other systems reviewed and are negative.      Physical Exam  Physical Exam  Vitals and nursing note reviewed.   Constitutional:       General: He is not in acute distress.     Appearance: Normal appearance. He is well-developed. He is not ill-appearing, toxic-appearing or diaphoretic.   HENT:      Head: Normocephalic and atraumatic.      Right Ear: External ear normal.      Left Ear: External ear normal.      Nose: Nose normal.      Mouth/Throat:      Mouth: Mucous membranes are moist.      Pharynx: Oropharynx is clear.   Eyes:      Conjunctiva/sclera: Conjunctivae normal.      Pupils: Pupils are equal, round, and reactive to light.   Cardiovascular:      Rate and Rhythm: Normal rate and regular rhythm.      Heart sounds: Normal heart sounds.   Pulmonary:      Effort: Pulmonary effort is normal. No respiratory distress.      Breath sounds: Normal breath sounds.   Abdominal:      General: Bowel sounds are normal. There is no distension.      Palpations: Abdomen is soft.      Tenderness: There is no abdominal tenderness. There is no guarding.   Musculoskeletal:         General: Normal range of motion.      Cervical back: Neck supple. No rigidity.      Right lower leg: No edema.      Left lower leg: No edema.   Skin:     General: Skin is warm and dry.      Capillary Refill: Capillary refill takes less than 2 seconds.   Neurological:      General: No focal deficit present.      Mental Status: He is alert and oriented to person, place, and time.   Psychiatric:         Mood and Affect: Mood normal.         Behavior: Behavior normal.         Vital Signs  ED Triage Vitals [05/10/24 0122]   Temperature Pulse Respirations " "Blood Pressure SpO2   98.2 °F (36.8 °C) 90 20 142/79 96 %      Temp Source Heart Rate Source Patient Position - Orthostatic VS BP Location FiO2 (%)   Tympanic Monitor Lying Left arm --      Pain Score       --           Vitals:    05/10/24 0122 05/10/24 0613   BP: 142/79 119/74   Pulse: 90 73   Patient Position - Orthostatic VS: Lying Lying         Visual Acuity      ED Medications  Medications - No data to display    Diagnostic Studies  Results Reviewed       Procedure Component Value Units Date/Time    Rapid drug screen, urine [[id number]] Collected: 05/10/24 0613    Lab Status: No result Specimen: Urine, Other     POCT alcohol breath test [[id number]]  (Normal) Resulted: 05/10/24 0125    Lab Status: Final result Updated: 05/10/24 0125     EXTBreath Alcohol 0.000                   No orders to display              Procedures  Procedures         ED Course                               SBIRT 22yo+      Flowsheet Row Most Recent Value   Initial Alcohol Screen: US AUDIT-C     1. How often do you have a drink containing alcohol? 4 Filed at: 05/10/2024 0112   2. How many drinks containing alcohol do you have on a typical day you are drinking?  0 Filed at: 05/10/2024 0112   3a. Male UNDER 65: How often do you have five or more drinks on one occasion? 0 Filed at: 05/10/2024 0112   Audit-C Score 4 Filed at: 05/10/2024 0112   SHELLI: How many times in the past year have you...    Used an illegal drug or used a prescription medication for non-medical reasons? Daily or Almost Daily Filed at: 05/10/2024 0112   DAST-10: In the past 12 months...    1. Have you used drugs other than those required for medical reasons? 1 Filed at: 05/10/2024 0112   2. Do you use more than one drug at a time? 1 Filed at: 05/10/2024 0112   3. Have you had medical problems as a result of your drug use (e.g., memory loss, hepatitis, convulsions, bleeding, etc.)? 1 Filed at: 05/10/2024 0112   4. Have you had \"blackouts\" or \"flashbacks\" as a result of drug " use?YesNo 0 Filed at: 05/10/2024 0112   5. Do you ever feel bad or guilty about your drug use? 1 Filed at: 05/10/2024 0112   6. Does your spouse (or parent) ever complain about your involvement with drugs? 1 Filed at: 05/10/2024 0112   7. Have you neglected your family because of your use of drugs? 1 Filed at: 05/10/2024 0112   8. Have you engaged in illegal activities in order to obtain drugs? 1 Filed at: 05/10/2024 0112   9. Have you ever experienced withdrawal symptoms (felt sick) when you stopped taking drugs? 0 Filed at: 05/10/2024 0112   10. Are you always able to stop using drugs when you want to? 1 Filed at: 05/10/2024 0112   DAST-10 Score 8 Filed at: 05/10/2024 0112                      Medical Decision Making  39-year-old gentleman presents requesting rehab/detox.  The patient his recent use of crack along with intermittent use of fentanyl.  Patient is not appropriate for the detox unit.  Will place a consult for host evaluation.  No acute medical concerns.    Amount and/or Complexity of Data Reviewed  Labs: ordered.             Disposition  Final diagnoses:   Drug abuse (HCC)     Time reflects when diagnosis was documented in both MDM as applicable and the Disposition within this note       Time User Action Codes Description Comment    5/10/2024  1:38 AM Noé Haro Add [F19.10] Drug abuse (HCC)           ED Disposition       None          Follow-up Information    None         Patient's Medications   Discharge Prescriptions    No medications on file       No discharge procedures on file.    PDMP Review       None            ED Provider  Electronically Signed by             Noé Haro DO  05/10/24 0619

## 2024-05-10 NOTE — ED NOTES
Pt provided with warm blanket and pillow. Pt requesting something to eat/ drink. This tech gave pt a sandwich, p.b. crackers, granola bar, cookies and juice @ bedside. Tolerating well. Call bell @ bedside as pt has no other needs @ this time.     Duane Dow  05/10/24 0129

## 2024-05-10 NOTE — ED CARE HANDOFF
Per HOST, an IP bed at [facility] is on hold, but pt will need to call to prescreen. [phone], opt 1 and opt1. His bed is reserved until 10:35am.

## 2024-05-10 NOTE — DISCHARGE INSTR - APPOINTMENTS
Heydi Eugene   Certified     [facility], [place] and [facility]  [phone]

## 2024-05-10 NOTE — ED CARE HANDOFF
Emergency Department Sign Out Note        Sign out and transfer of care from Dr. Haro. See Separate Emergency Department note.     The patient, Gray Landaverde, was evaluated by the previous provider for Detox.    Workup Completed:  [facility]  at 4 pm    ED Course / Workup Pending (followup):  At 150 pm patient says he needs to leave to get clothes, says will come back and reschedule                                     Procedures  Medical Decision Making  Amount and/or Complexity of Data Reviewed  Labs: ordered.            Disposition  Final diagnoses:   Drug abuse (HCC)     Time reflects when diagnosis was documented in both MDM as applicable and the Disposition within this note       Time User Action Codes Description Comment    5/10/2024  1:38 AM Noé Haro Add [F19.10] Drug abuse (HCC)           ED Disposition       ED Disposition   Discharge    Condition   Stable    Date/Time   Fri May 10, 2024  1:47 PM    Comment   Gray Landaverde discharge to home/self care.                   Follow-up Information       Follow up With Specialties Details Why Contact Info Additional Information    [facility]   [address] [place] [id number] [phone] [facility], [address] [place] [address], [id number] [phone]          Patient's Medications   Discharge Prescriptions    No medications on file     No discharge procedures on file.       ED Provider  Electronically Signed by     Yael Gross MD  05/10/24 8623

## 2024-05-10 NOTE — ED CARE HANDOFF
Contacted HOST via secure voicemail; forwarded contact and clinical information.  Also contacted ED to confirm listed cell number (Contact: Chris and via Nataly KASPER RN); information changed/updated.

## 2024-05-10 NOTE — ED NOTES
Per [facility] staff, pt getting picked up from ER at 1630 by [facility]  [facility] ITALIA Villanueva RN  05/10/24 2189

## 2024-09-21 ENCOUNTER — APPOINTMENT (EMERGENCY)
Dept: RADIOLOGY | Facility: HOSPITAL | Age: 39
End: 2024-09-21
Payer: COMMERCIAL

## 2024-09-21 ENCOUNTER — HOSPITAL ENCOUNTER (EMERGENCY)
Facility: HOSPITAL | Age: 39
Discharge: HOME | End: 2024-09-21
Attending: EMERGENCY MEDICINE
Payer: COMMERCIAL

## 2024-09-21 VITALS
OXYGEN SATURATION: 98 % | HEART RATE: 74 BPM | TEMPERATURE: 98.9 F | RESPIRATION RATE: 18 BRPM | SYSTOLIC BLOOD PRESSURE: 135 MMHG | WEIGHT: 159.9 LBS | DIASTOLIC BLOOD PRESSURE: 73 MMHG

## 2024-09-21 DIAGNOSIS — F19.10 POLYSUBSTANCE ABUSE (HCC): Primary | ICD-10-CM

## 2024-09-21 DIAGNOSIS — S81.809A MULTIPLE OPEN WOUNDS OF LOWER EXTREMITY: ICD-10-CM

## 2024-09-21 LAB — ETHANOL EXG-MCNC: 0 MG/DL

## 2024-09-21 PROCEDURE — 99284 EMERGENCY DEPT VISIT MOD MDM: CPT | Performed by: PHYSICIAN ASSISTANT

## 2024-09-21 PROCEDURE — 87147 CULTURE TYPE IMMUNOLOGIC: CPT | Performed by: PHYSICIAN ASSISTANT

## 2024-09-21 PROCEDURE — 87205 SMEAR GRAM STAIN: CPT | Performed by: PHYSICIAN ASSISTANT

## 2024-09-21 PROCEDURE — 99283 EMERGENCY DEPT VISIT LOW MDM: CPT

## 2024-09-21 PROCEDURE — 87077 CULTURE AEROBIC IDENTIFY: CPT | Performed by: PHYSICIAN ASSISTANT

## 2024-09-21 PROCEDURE — 82075 ASSAY OF BREATH ETHANOL: CPT | Performed by: PHYSICIAN ASSISTANT

## 2024-09-21 PROCEDURE — 87186 SC STD MICRODIL/AGAR DIL: CPT | Performed by: PHYSICIAN ASSISTANT

## 2024-09-21 PROCEDURE — 87070 CULTURE OTHR SPECIMN AEROBIC: CPT | Performed by: PHYSICIAN ASSISTANT

## 2024-09-21 PROCEDURE — 71046 X-RAY EXAM CHEST 2 VIEWS: CPT

## 2024-09-21 RX ORDER — DOXYCYCLINE 100 MG/1
100 CAPSULE ORAL 2 TIMES DAILY
Qty: 20 CAPSULE | Refills: 0 | Status: SHIPPED | OUTPATIENT
Start: 2024-09-21 | End: 2024-10-01

## 2024-09-21 NOTE — ED PROVIDER NOTES
1. Polysubstance abuse (HCC)    2. Multiple open wounds of lower extremity      ED Disposition       ED Disposition   Discharge    Condition   Stable    Date/Time   Sat Sep 21, 2024 12:11 PM    Comment   Gray Landaverde discharge to home/self care.                   Assessment & Plan       Medical Decision Making  Differential diagnosis includes but not limited to: Polysubstance abuse, lower extremity wounds, abscess, skin picking, cellulitis    Problems Addressed:  Multiple open wounds of lower extremity: acute illness or injury  Polysubstance abuse (HCC): acute illness or injury    Amount and/or Complexity of Data Reviewed  Labs: ordered. Decision-making details documented in ED Course.  Radiology: ordered and independent interpretation performed. Decision-making details documented in ED Course.    Risk  Prescription drug management.                ED Course as of 09/21/24 1445   Sat Sep 21, 2024   1131 No signs of pneumonia on chest x-ray.  Will cover with doxycycline for wounds and cough likely caused by use of illicit substances.   1218 Patient reports having not taken any of his medications in the past 2 months.       Medications - No data to display    History of Present Illness       39-year-old male presents the emergency department requesting rehab.  States that he has been smoking crack and snorting cocaine daily.  Last use earlier this morning.  No IV drug use.  States that he has also had some wounds over the lower extremities bilaterally.  Sometimes painful in nature.  No fevers recently.  Additionally notes an ongoing cough, sometimes with yellow sputum production.  Denies shortness of breath or chest pain at this time.      History provided by:  Patient   used: No    Detox Evaluation  Similar prior episodes: yes    Severity:  Unable to specify  Timing:  Intermittent  Progression:  Waxing and waning  Chronicity:  Recurrent  Suspected agents:  Crack and cocaine  Associated  symptoms: no abdominal pain, no agitation, no blackouts, no bladder incontinence, no bowel incontinence, no confusion, no hallucinations, no headaches, no loss of consciousness, no nausea, no palpitations, no seizures, no shortness of breath and no somnolence    Wound Check         Review of Systems   Constitutional:  Negative for chills and fever.   Respiratory:  Positive for cough. Negative for shortness of breath.    Cardiovascular:  Negative for chest pain and palpitations.   Gastrointestinal:  Negative for abdominal pain, bowel incontinence and nausea.   Genitourinary:  Negative for bladder incontinence.   Musculoskeletal:  Negative for arthralgias and back pain.   Skin:  Positive for wound. Negative for rash.   Neurological:  Negative for seizures, loss of consciousness and headaches.   Psychiatric/Behavioral:  Negative for agitation, confusion and hallucinations.    All other systems reviewed and are negative.          Objective     ED Triage Vitals [09/21/24 0928]   Temperature Pulse Blood Pressure Respirations SpO2 Patient Position - Orthostatic VS   98.9 °F (37.2 °C) 95 127/80 18 95 % Lying      Temp Source Heart Rate Source BP Location FiO2 (%) Pain Score    Oral Monitor Right arm -- --        Physical Exam  Vitals and nursing note reviewed.   Constitutional:       Appearance: He is well-developed.   HENT:      Head: Normocephalic and atraumatic.   Cardiovascular:      Rate and Rhythm: Normal rate and regular rhythm.   Pulmonary:      Effort: Pulmonary effort is normal. No respiratory distress.      Breath sounds: No wheezing, rhonchi or rales.   Skin:     General: Skin is warm and dry.      Comments: Several raised area in the lower extremities bilaterally with overlying scabs.  Wound on the lateral left lower extremity appears to have some bleeding from underneath one of the scabs.   Neurological:      Mental Status: He is alert and oriented to person, place, and time.   Psychiatric:         Mood and  Affect: Mood normal.         Behavior: Behavior normal.                   Labs Reviewed   POCT ALCOHOL BREATH TEST - Normal       Result Value    EXTBreath Alcohol 0.00     WOUND CULTURE   RAPID DRUG SCREEN, URINE     XR chest 2 views   ED Interpretation by Bailee An PA-C (09/21 1013)   No focal consolidation      Final Interpretation by Soila Blakely MD (09/21 1206)      No acute cardiopulmonary disease.            Workstation performed: [id number]             Procedures    ED Medication and Procedure Management   Prior to Admission Medications   Prescriptions Last Dose Informant Patient Reported? Taking?   FLUoxetine (PROzac) 40 MG capsule   No No   Sig: Take 1 capsule (40 mg total) by mouth daily   Patient not taking: Reported on 5/10/2024   OLANZapine (ZyPREXA) 10 mg tablet  Self No No   Sig: Take 1 tablet (10 mg total) by mouth daily at bedtime   Patient taking differently: Take 20 mg by mouth daily at bedtime   hydrOXYzine HCL (ATARAX) 25 mg tablet   No No   Sig: Take 1 tablet (25 mg total) by mouth every 6 (six) hours   Patient not taking: Reported on 2/20/2021   lamoTRIgine (LaMICtal) 25 mg tablet  Self Yes No   Sig: Take 15 mg by mouth daily   mirtazapine (REMERON) 30 mg tablet   Yes No   Sig: Take 30 mg by mouth daily at bedtime   Patient not taking: Reported on 5/10/2024      Facility-Administered Medications: None     Discharge Medication List as of 9/21/2024  2:31 PM        START taking these medications    Details   doxycycline hyclate (VIBRAMYCIN) 100 mg capsule Take 1 capsule (100 mg total) by mouth 2 (two) times a day for 10 days, Starting Sat 9/21/2024, Until Tue 10/1/2024, Normal           CONTINUE these medications which have NOT CHANGED    Details   FLUoxetine (PROzac) 40 MG capsule Take 1 capsule (40 mg total) by mouth daily, Starting Fri 10/20/2023, Print      hydrOXYzine HCL (ATARAX) 25 mg tablet Take 1 tablet (25 mg total) by mouth every 6 (six) hours, Starting Thu 2/18/2021,  Normal      lamoTRIgine (LaMICtal) 25 mg tablet Take 15 mg by mouth daily, Historical Med      mirtazapine (REMERON) 30 mg tablet Take 30 mg by mouth daily at bedtime, Historical Med      OLANZapine (ZyPREXA) 10 mg tablet Take 1 tablet (10 mg total) by mouth daily at bedtime, Starting Fri 10/20/2023, Print           No discharge procedures on file.     MITCHEL WakefieldC  09/21/24 7198

## 2024-09-21 NOTE — ED CARE HANDOFF
[facility] Warm Handoff Outcome Note    Patient name Gray Landaverde  Location Z3H1/Z3H1 MRN [id number]  Age: 39 y.o.          Plan Type:  Warm Handoff                                                                                    Plan Date: 9/21/2024  Service:  ED Warm Handoff      Substance Use History:  Cocaine, meth     Warm Handoff Update:  [facility]    Warm Handoff Outcome: Residential  Inpatient

## 2024-09-23 LAB
BACTERIA WND AEROBE CULT: ABNORMAL
BACTERIA WND AEROBE CULT: ABNORMAL
GRAM STN SPEC: ABNORMAL
GRAM STN SPEC: ABNORMAL